# Patient Record
Sex: FEMALE | Race: WHITE
[De-identification: names, ages, dates, MRNs, and addresses within clinical notes are randomized per-mention and may not be internally consistent; named-entity substitution may affect disease eponyms.]

---

## 2020-11-18 ENCOUNTER — HOSPITAL ENCOUNTER (OUTPATIENT)
Dept: HOSPITAL 8 - CFH | Age: 65
Discharge: HOME | End: 2020-11-18
Attending: NURSE PRACTITIONER
Payer: MEDICARE

## 2020-11-18 DIAGNOSIS — Z12.31: Primary | ICD-10-CM

## 2020-11-18 PROCEDURE — 77067 SCR MAMMO BI INCL CAD: CPT

## 2020-11-18 PROCEDURE — 77063 BREAST TOMOSYNTHESIS BI: CPT

## 2021-01-13 ENCOUNTER — HOSPITAL ENCOUNTER (OUTPATIENT)
Dept: HOSPITAL 8 - CFH | Age: 66
Discharge: HOME | End: 2021-01-13
Attending: FAMILY MEDICINE
Payer: MEDICARE

## 2021-01-13 DIAGNOSIS — M85.88: ICD-10-CM

## 2021-01-13 DIAGNOSIS — Z13.820: Primary | ICD-10-CM

## 2021-01-13 PROCEDURE — 77080 DXA BONE DENSITY AXIAL: CPT

## 2021-03-03 DIAGNOSIS — Z23 NEED FOR VACCINATION: ICD-10-CM

## 2023-06-22 ENCOUNTER — HOSPITAL ENCOUNTER (EMERGENCY)
Facility: MEDICAL CENTER | Age: 68
End: 2023-06-22
Attending: EMERGENCY MEDICINE
Payer: MEDICARE

## 2023-06-22 ENCOUNTER — APPOINTMENT (OUTPATIENT)
Dept: RADIOLOGY | Facility: MEDICAL CENTER | Age: 68
End: 2023-06-22
Attending: EMERGENCY MEDICINE
Payer: MEDICARE

## 2023-06-22 VITALS
DIASTOLIC BLOOD PRESSURE: 75 MMHG | SYSTOLIC BLOOD PRESSURE: 140 MMHG | HEIGHT: 65 IN | RESPIRATION RATE: 16 BRPM | OXYGEN SATURATION: 96 % | TEMPERATURE: 98.3 F | BODY MASS INDEX: 23.1 KG/M2 | HEART RATE: 68 BPM | WEIGHT: 138.67 LBS

## 2023-06-22 DIAGNOSIS — N13.2 HYDRONEPHROSIS WITH URINARY OBSTRUCTION DUE TO URETERAL CALCULUS: ICD-10-CM

## 2023-06-22 DIAGNOSIS — R11.2 NAUSEA AND VOMITING, UNSPECIFIED VOMITING TYPE: ICD-10-CM

## 2023-06-22 DIAGNOSIS — I71.21 ANEURYSM OF ASCENDING AORTA WITHOUT RUPTURE (HCC): ICD-10-CM

## 2023-06-22 DIAGNOSIS — N20.1 URETERAL STONE: ICD-10-CM

## 2023-06-22 LAB
ALBUMIN SERPL BCP-MCNC: 4.4 G/DL (ref 3.2–4.9)
ALBUMIN/GLOB SERPL: 1.7 G/DL
ALP SERPL-CCNC: 81 U/L (ref 30–99)
ALT SERPL-CCNC: 27 U/L (ref 2–50)
ANION GAP SERPL CALC-SCNC: 12 MMOL/L (ref 7–16)
APPEARANCE UR: CLEAR
AST SERPL-CCNC: 25 U/L (ref 12–45)
BASOPHILS # BLD AUTO: 0.9 % (ref 0–1.8)
BASOPHILS # BLD: 0.08 K/UL (ref 0–0.12)
BILIRUB SERPL-MCNC: 0.2 MG/DL (ref 0.1–1.5)
BILIRUB UR QL STRIP.AUTO: NEGATIVE
BUN SERPL-MCNC: 18 MG/DL (ref 8–22)
CALCIUM ALBUM COR SERPL-MCNC: 10 MG/DL (ref 8.5–10.5)
CALCIUM SERPL-MCNC: 10.3 MG/DL (ref 8.4–10.2)
CHLORIDE SERPL-SCNC: 101 MMOL/L (ref 96–112)
CO2 SERPL-SCNC: 25 MMOL/L (ref 20–33)
COLOR UR: YELLOW
CREAT SERPL-MCNC: 0.85 MG/DL (ref 0.5–1.4)
EOSINOPHIL # BLD AUTO: 0.23 K/UL (ref 0–0.51)
EOSINOPHIL NFR BLD: 2.6 % (ref 0–6.9)
ERYTHROCYTE [DISTWIDTH] IN BLOOD BY AUTOMATED COUNT: 42.5 FL (ref 35.9–50)
GFR SERPLBLD CREATININE-BSD FMLA CKD-EPI: 74 ML/MIN/1.73 M 2
GLOBULIN SER CALC-MCNC: 2.6 G/DL (ref 1.9–3.5)
GLUCOSE SERPL-MCNC: 70 MG/DL (ref 65–99)
GLUCOSE UR STRIP.AUTO-MCNC: NEGATIVE MG/DL
HCT VFR BLD AUTO: 41 % (ref 37–47)
HGB BLD-MCNC: 13.8 G/DL (ref 12–16)
IMM GRANULOCYTES # BLD AUTO: 0.02 K/UL (ref 0–0.11)
IMM GRANULOCYTES NFR BLD AUTO: 0.2 % (ref 0–0.9)
KETONES UR STRIP.AUTO-MCNC: NEGATIVE MG/DL
LEUKOCYTE ESTERASE UR QL STRIP.AUTO: NEGATIVE
LYMPHOCYTES # BLD AUTO: 3.91 K/UL (ref 1–4.8)
LYMPHOCYTES NFR BLD: 44.1 % (ref 22–41)
MCH RBC QN AUTO: 31.2 PG (ref 27–33)
MCHC RBC AUTO-ENTMCNC: 33.7 G/DL (ref 32.2–35.5)
MCV RBC AUTO: 92.6 FL (ref 81.4–97.8)
MICRO URNS: NORMAL
MONOCYTES # BLD AUTO: 0.86 K/UL (ref 0–0.85)
MONOCYTES NFR BLD AUTO: 9.7 % (ref 0–13.4)
NEUTROPHILS # BLD AUTO: 3.76 K/UL (ref 1.82–7.42)
NEUTROPHILS NFR BLD: 42.5 % (ref 44–72)
NITRITE UR QL STRIP.AUTO: NEGATIVE
NRBC # BLD AUTO: 0 K/UL
NRBC BLD-RTO: 0 /100 WBC (ref 0–0.2)
PH UR STRIP.AUTO: 5.5 [PH] (ref 5–8)
PLATELET # BLD AUTO: 313 K/UL (ref 164–446)
PMV BLD AUTO: 9.3 FL (ref 9–12.9)
POTASSIUM SERPL-SCNC: 3.7 MMOL/L (ref 3.6–5.5)
PROT SERPL-MCNC: 7 G/DL (ref 6–8.2)
PROT UR QL STRIP: NEGATIVE MG/DL
RBC # BLD AUTO: 4.43 M/UL (ref 4.2–5.4)
RBC UR QL AUTO: NEGATIVE
SODIUM SERPL-SCNC: 138 MMOL/L (ref 135–145)
SP GR UR STRIP.AUTO: >=1.03
WBC # BLD AUTO: 8.9 K/UL (ref 4.8–10.8)

## 2023-06-22 PROCEDURE — A9270 NON-COVERED ITEM OR SERVICE: HCPCS | Performed by: STUDENT IN AN ORGANIZED HEALTH CARE EDUCATION/TRAINING PROGRAM

## 2023-06-22 PROCEDURE — 85025 COMPLETE CBC W/AUTO DIFF WBC: CPT

## 2023-06-22 PROCEDURE — 96374 THER/PROPH/DIAG INJ IV PUSH: CPT

## 2023-06-22 PROCEDURE — 96375 TX/PRO/DX INJ NEW DRUG ADDON: CPT

## 2023-06-22 PROCEDURE — 80053 COMPREHEN METABOLIC PANEL: CPT

## 2023-06-22 PROCEDURE — 700102 HCHG RX REV CODE 250 W/ 637 OVERRIDE(OP): Performed by: STUDENT IN AN ORGANIZED HEALTH CARE EDUCATION/TRAINING PROGRAM

## 2023-06-22 PROCEDURE — 96376 TX/PRO/DX INJ SAME DRUG ADON: CPT

## 2023-06-22 PROCEDURE — 81003 URINALYSIS AUTO W/O SCOPE: CPT

## 2023-06-22 PROCEDURE — 99284 EMERGENCY DEPT VISIT MOD MDM: CPT

## 2023-06-22 PROCEDURE — 700105 HCHG RX REV CODE 258: Performed by: EMERGENCY MEDICINE

## 2023-06-22 PROCEDURE — 700111 HCHG RX REV CODE 636 W/ 250 OVERRIDE (IP): Performed by: STUDENT IN AN ORGANIZED HEALTH CARE EDUCATION/TRAINING PROGRAM

## 2023-06-22 PROCEDURE — 36415 COLL VENOUS BLD VENIPUNCTURE: CPT

## 2023-06-22 PROCEDURE — 700111 HCHG RX REV CODE 636 W/ 250 OVERRIDE (IP): Performed by: EMERGENCY MEDICINE

## 2023-06-22 PROCEDURE — 74176 CT ABD & PELVIS W/O CONTRAST: CPT

## 2023-06-22 RX ORDER — ONDANSETRON 2 MG/ML
4 INJECTION INTRAMUSCULAR; INTRAVENOUS ONCE
Status: COMPLETED | OUTPATIENT
Start: 2023-06-22 | End: 2023-06-22

## 2023-06-22 RX ORDER — OXYCODONE HYDROCHLORIDE 5 MG/1
5 TABLET ORAL EVERY 4 HOURS PRN
Qty: 16 TABLET | Refills: 0 | Status: SHIPPED | OUTPATIENT
Start: 2023-06-22 | End: 2023-06-27

## 2023-06-22 RX ORDER — ONDANSETRON 4 MG/1
4 TABLET, ORALLY DISINTEGRATING ORAL EVERY 8 HOURS PRN
Qty: 10 TABLET | Refills: 1 | Status: SHIPPED | OUTPATIENT
Start: 2023-06-22 | End: 2023-08-11

## 2023-06-22 RX ORDER — TAMSULOSIN HYDROCHLORIDE 0.4 MG/1
0.4 CAPSULE ORAL
Qty: 7 CAPSULE | Refills: 0 | Status: SHIPPED | OUTPATIENT
Start: 2023-06-22 | End: 2023-06-22 | Stop reason: SDUPTHER

## 2023-06-22 RX ORDER — TAMSULOSIN HYDROCHLORIDE 0.4 MG/1
0.4 CAPSULE ORAL
Qty: 7 CAPSULE | Refills: 0 | Status: SHIPPED | OUTPATIENT
Start: 2023-06-22 | End: 2023-06-29

## 2023-06-22 RX ORDER — SODIUM CHLORIDE 9 MG/ML
1000 INJECTION, SOLUTION INTRAVENOUS ONCE
Status: COMPLETED | OUTPATIENT
Start: 2023-06-22 | End: 2023-06-22

## 2023-06-22 RX ORDER — ONDANSETRON 4 MG/1
4 TABLET, ORALLY DISINTEGRATING ORAL EVERY 8 HOURS PRN
Qty: 10 TABLET | Refills: 1 | Status: SHIPPED | OUTPATIENT
Start: 2023-06-22 | End: 2023-06-22 | Stop reason: SDUPTHER

## 2023-06-22 RX ORDER — ACETAMINOPHEN 500 MG
1000 TABLET ORAL ONCE
Status: COMPLETED | OUTPATIENT
Start: 2023-06-22 | End: 2023-06-22

## 2023-06-22 RX ORDER — OXYCODONE HYDROCHLORIDE 5 MG/1
5 TABLET ORAL EVERY 4 HOURS PRN
Qty: 16 TABLET | Refills: 0 | Status: SHIPPED | OUTPATIENT
Start: 2023-06-22 | End: 2023-06-22 | Stop reason: SDUPTHER

## 2023-06-22 RX ORDER — KETOROLAC TROMETHAMINE 30 MG/ML
15 INJECTION, SOLUTION INTRAMUSCULAR; INTRAVENOUS ONCE
Status: COMPLETED | OUTPATIENT
Start: 2023-06-22 | End: 2023-06-22

## 2023-06-22 RX ADMIN — FENTANYL CITRATE 50 MCG: 50 INJECTION, SOLUTION INTRAMUSCULAR; INTRAVENOUS at 22:56

## 2023-06-22 RX ADMIN — ACETAMINOPHEN 1000 MG: 500 TABLET ORAL at 22:56

## 2023-06-22 RX ADMIN — KETOROLAC TROMETHAMINE 15 MG: 30 INJECTION, SOLUTION INTRAMUSCULAR; INTRAVENOUS at 22:15

## 2023-06-22 RX ADMIN — ONDANSETRON 4 MG: 2 INJECTION INTRAMUSCULAR; INTRAVENOUS at 22:15

## 2023-06-22 RX ADMIN — SODIUM CHLORIDE 1000 ML: 9 INJECTION, SOLUTION INTRAVENOUS at 20:48

## 2023-06-22 RX ADMIN — ONDANSETRON 4 MG: 2 INJECTION INTRAMUSCULAR; INTRAVENOUS at 20:49

## 2023-06-22 ASSESSMENT — LIFESTYLE VARIABLES
AVERAGE NUMBER OF DAYS PER WEEK YOU HAVE A DRINK CONTAINING ALCOHOL: 0
DO YOU DRINK ALCOHOL: NO
ON A TYPICAL DAY WHEN YOU DRINK ALCOHOL HOW MANY DRINKS DO YOU HAVE: 0
EVER HAD A DRINK FIRST THING IN THE MORNING TO STEADY YOUR NERVES TO GET RID OF A HANGOVER: NO
HAVE PEOPLE ANNOYED YOU BY CRITICIZING YOUR DRINKING: NO
TOTAL SCORE: 0
TOTAL SCORE: 0
HOW MANY TIMES IN THE PAST YEAR HAVE YOU HAD 5 OR MORE DRINKS IN A DAY: 0
EVER FELT BAD OR GUILTY ABOUT YOUR DRINKING: NO
TOTAL SCORE: 0
CONSUMPTION TOTAL: NEGATIVE
HAVE YOU EVER FELT YOU SHOULD CUT DOWN ON YOUR DRINKING: NO

## 2023-06-22 ASSESSMENT — PAIN DESCRIPTION - DESCRIPTORS: DESCRIPTORS: SHARP

## 2023-06-22 ASSESSMENT — FIBROSIS 4 INDEX: FIB4 SCORE: 1.33

## 2023-06-23 NOTE — ED TRIAGE NOTES
"Chief Complaint   Patient presents with    Flank Pain     Flank pain x 2 hours, nausea, and vomiting. Pt states she feels like she may have a UTI.     Urinary Frequency     Pt notes urinary frequency and discomfort x 2 days      BP (!) 143/86   Pulse 61   Temp 36.9 °C (98.4 °F) (Temporal)   Resp 18   Ht 1.651 m (5' 5\")   Wt 62.9 kg (138 lb 10.7 oz)   SpO2 98%   BMI 23.08 kg/m²     "

## 2023-06-23 NOTE — ED NOTES
Went to discharge pt, pt requesting more pain meds before she leaves. ERP aware and went to talk to pt

## 2023-06-23 NOTE — ED PROVIDER NOTES
Patient was seen earlier diagnosed with ureteral colic prior to discharge she requested a single dose of narcotic pain medications, as well as changing her narcotic prescription to the 24-hour pharmacy.  She was given a dose of fentanyl observed in the emergency department with good pain control no further episodes of vomiting her up oxycodone was sent to the 24-hour [facility] and she was discharged in a stable condition

## 2023-06-23 NOTE — DISCHARGE INSTRUCTIONS
Follow-up with urology.  Strain your urine to catch the stone.  Return for uncontrollable pain or nausea and vomiting.    Follow-up with cardiology, your CAT scan today revealed 4.2 cm a sending aortic aneurysm which needs follow-up and monitoring.

## 2023-06-23 NOTE — ED PROVIDER NOTES
"ED Provider Note    CHIEF COMPLAINT  Chief Complaint   Patient presents with    Flank Pain     Flank pain x 2 hours, nausea, and vomiting. Pt states she feels like she may have a UTI.     Urinary Frequency     Pt notes urinary frequency and discomfort x 2 days          HPI/ROS  LIMITATION TO HISTORY   Select: : None  OUTSIDE HISTORIAN(S):  Significant other patient's  at bedside for discussion of symptoms    Jerad Rodriguez is a 67 y.o. female who presents with left flank pain, nausea and vomiting onset during workout.  No associated trauma.  No history of kidney stones.  She has had urinary urgency in the last 3 days.  No fever or chills.  No vaginal discharge, no hematuria.  She denies anterior abdominal pain.  No chest pain or difficulty breathing.  No chest pain.    PAST MEDICAL HISTORY       SURGICAL HISTORY  patient denies any surgical history    FAMILY HISTORY  No family history on file.    SOCIAL HISTORY  Social History     Tobacco Use    Smoking status: Not on file    Smokeless tobacco: Not on file   Substance and Sexual Activity    Alcohol use: Not on file    Drug use: Not on file    Sexual activity: Not on file       CURRENT MEDICATIONS  Home Medications       Reviewed by Jimmie Rome R.N. (Registered Nurse) on 06/22/23 at 2037  Med List Status: Not Addressed     Medication Last Dose Status        Patient Carl Taking any Medications                           ALLERGIES  Allergies   Allergen Reactions    Ampicillin Rash     Itching, hives    Sulfa Drugs Hives       PHYSICAL EXAM  VITAL SIGNS: BP (!) 142/78   Pulse 67   Temp 36.9 °C (98.4 °F) (Temporal)   Resp 16   Ht 1.651 m (5' 5\")   Wt 62.9 kg (138 lb 10.7 oz)   SpO2 98%   BMI 23.08 kg/m²    Constitutional: Well-nourished, mild distress  Musculoskeletal: Left flank tenderness  GI: Abdomen is soft and nontender, no guarding, no distention  Neurologic: Speech clear, strength intact  Respiratory: Clear lung sounds  Cardiac: Regular rate and " rhythm    DIAGNOSTIC STUDIES / PROCEDURES      LABS  Results for orders placed or performed during the hospital encounter of 06/22/23   URINALYSIS (UA)    Specimen: Urine   Result Value Ref Range    Color Yellow     Character Clear     Specific Gravity >=1.030 <1.035    Ph 5.5 5.0 - 8.0    Glucose Negative Negative mg/dL    Ketones Negative Negative mg/dL    Protein Negative Negative mg/dL    Bilirubin Negative Negative    Nitrite Negative Negative    Leukocyte Esterase Negative Negative    Occult Blood Negative Negative    Micro Urine Req see below    CBC WITH DIFFERENTIAL   Result Value Ref Range    WBC 8.9 4.8 - 10.8 K/uL    RBC 4.43 4.20 - 5.40 M/uL    Hemoglobin 13.8 12.0 - 16.0 g/dL    Hematocrit 41.0 37.0 - 47.0 %    MCV 92.6 81.4 - 97.8 fL    MCH 31.2 27.0 - 33.0 pg    MCHC 33.7 32.2 - 35.5 g/dL    RDW 42.5 35.9 - 50.0 fL    Platelet Count 313 164 - 446 K/uL    MPV 9.3 9.0 - 12.9 fL    Neutrophils-Polys 42.50 (L) 44.00 - 72.00 %    Lymphocytes 44.10 (H) 22.00 - 41.00 %    Monocytes 9.70 0.00 - 13.40 %    Eosinophils 2.60 0.00 - 6.90 %    Basophils 0.90 0.00 - 1.80 %    Immature Granulocytes 0.20 0.00 - 0.90 %    Nucleated RBC 0.00 0.00 - 0.20 /100 WBC    Neutrophils (Absolute) 3.76 1.82 - 7.42 K/uL    Lymphs (Absolute) 3.91 1.00 - 4.80 K/uL    Monos (Absolute) 0.86 (H) 0.00 - 0.85 K/uL    Eos (Absolute) 0.23 0.00 - 0.51 K/uL    Baso (Absolute) 0.08 0.00 - 0.12 K/uL    Immature Granulocytes (abs) 0.02 0.00 - 0.11 K/uL    NRBC (Absolute) 0.00 K/uL   COMP METABOLIC PANEL   Result Value Ref Range    Sodium 138 135 - 145 mmol/L    Potassium 3.7 3.6 - 5.5 mmol/L    Chloride 101 96 - 112 mmol/L    Co2 25 20 - 33 mmol/L    Anion Gap 12.0 7.0 - 16.0    Glucose 70 65 - 99 mg/dL    Bun 18 8 - 22 mg/dL    Creatinine 0.85 0.50 - 1.40 mg/dL    Calcium 10.3 (H) 8.4 - 10.2 mg/dL    AST(SGOT) 25 12 - 45 U/L    ALT(SGPT) 27 2 - 50 U/L    Alkaline Phosphatase 81 30 - 99 U/L    Total Bilirubin 0.2 0.1 - 1.5 mg/dL    Albumin 4.4  3.2 - 4.9 g/dL    Total Protein 7.0 6.0 - 8.2 g/dL    Globulin 2.6 1.9 - 3.5 g/dL    A-G Ratio 1.7 g/dL   CORRECTED CALCIUM   Result Value Ref Range    Correct Calcium 10.0 8.5 - 10.5 mg/dL   ESTIMATED GFR   Result Value Ref Range    GFR (CKD-EPI) 74 >60 mL/min/1.73 m 2         RADIOLOGY  I have independently interpreted the diagnostic imaging associated with this visit and am waiting the final reading from the radiologist.   My preliminary interpretation is as follows: CT scan abdomen pelvis shows left ureteral stone  Radiologist interpretation:   CT-RENAL COLIC EVALUATION(A/P W/O)   Final Result         1.  Left hydronephrosis and hydroureter with 4.0 mm calcification in the expected location of the left ureterovesicular junction suggesting obstructing ureterovesicular junction stone.   2.  4.2 cm proximal ascending thoracic aortic aneurysm, radiographic follow-up and surveillance recommended as clinically appropriate   3.  Atherosclerosis            COURSE & MEDICAL DECISION MAKING    ED Observation Status? No; Patient does not meet criteria for ED Observation.     INITIAL ASSESSMENT, COURSE AND PLAN  Care Narrative: Patient presents with left flank pain, nausea and vomiting concerning for kidney stone.  Urinalysis obtained to rule out pyelonephritis or UTI which was of concern given the evidence of obstructing left ureteral stone.  Urinalysis is negative for both blood and infection.  Patient provided urine strainer.  Differential diagnosis of the flank pain also included injury however no evidence of trauma on CT scan.  Nausea and vomiting is likely secondary to the obstructing stone, with hydronephrosis.  This was treated with Zofran twice in the ER, currently no active vomiting.  Patient received IV fluids for vomiting, tolerated this well with improvement.  Patient received 15 mg of IV Toradol with moderate relief of discomfort.  Patient states she feels well enough to go home, I have prescribed oxycodone  for pain, Zofran for nausea, Flomax to help pass the stone.  They are provided urine strainer, referred to urology.  HYDRATION: Based on the patient's presentation of Acute Vomiting the patient was given IV fluids. IV Hydration was used because oral hydration was not adequate alone. Upon recheck following hydration, the patient was improving.      ADDITIONAL PROBLEM LIST  Ascending aortic aneurysm: Incidental finding on her renal colic CT study.  Measured at 4.2 cm, this was discussed with the patient and her  and the need for follow-up, they have been referred to cardiology to establish care and for follow-up testing.    DISPOSITION AND DISCUSSIONS    Escalation of care considered, and ultimately not performed:acute inpatient care management, however at this time, the patient is most appropriate for outpatient management    Decision tools and prescription drugs considered including, but not limited to: Antibiotics were not required, no evidence of pyelonephritis .    FINAL DIAGNOSIS  1. Ureteral stone    2. Hydronephrosis with urinary obstruction due to ureteral calculus    3. Nausea and vomiting, unspecified vomiting type    4. Aneurysm of ascending aorta without rupture (HCC)           Electronically signed by: Mick Matias M.D., 6/22/2023 10:21 PM

## 2023-08-03 ENCOUNTER — TELEPHONE (OUTPATIENT)
Dept: CARDIOLOGY | Facility: MEDICAL CENTER | Age: 68
End: 2023-08-03
Payer: MEDICARE

## 2023-08-03 NOTE — TELEPHONE ENCOUNTER
Called patient to request records for NP appointment with   . Called to confirm if this will be first time patient is seeing a cardiologist. No answer, left voicemail to call back.

## 2023-08-11 ENCOUNTER — OFFICE VISIT (OUTPATIENT)
Dept: CARDIOLOGY | Facility: MEDICAL CENTER | Age: 68
End: 2023-08-11
Attending: EMERGENCY MEDICINE
Payer: MEDICARE

## 2023-08-11 VITALS
OXYGEN SATURATION: 100 % | RESPIRATION RATE: 16 BRPM | BODY MASS INDEX: 22.59 KG/M2 | DIASTOLIC BLOOD PRESSURE: 68 MMHG | HEIGHT: 65 IN | SYSTOLIC BLOOD PRESSURE: 106 MMHG | WEIGHT: 135.6 LBS | HEART RATE: 63 BPM

## 2023-08-11 DIAGNOSIS — E78.00 ELEVATED SERUM CHOLESTEROL: ICD-10-CM

## 2023-08-11 DIAGNOSIS — I71.21 ANEURYSM OF ASCENDING AORTA WITHOUT RUPTURE (HCC): ICD-10-CM

## 2023-08-11 PROCEDURE — 3074F SYST BP LT 130 MM HG: CPT | Performed by: INTERNAL MEDICINE

## 2023-08-11 PROCEDURE — 3078F DIAST BP <80 MM HG: CPT | Performed by: INTERNAL MEDICINE

## 2023-08-11 PROCEDURE — 99203 OFFICE O/P NEW LOW 30 MIN: CPT | Performed by: INTERNAL MEDICINE

## 2023-08-11 PROCEDURE — 99211 OFF/OP EST MAY X REQ PHY/QHP: CPT | Performed by: INTERNAL MEDICINE

## 2023-08-11 PROCEDURE — 93005 ELECTROCARDIOGRAM TRACING: CPT | Performed by: INTERNAL MEDICINE

## 2023-08-11 RX ORDER — TIMOLOL MALEATE 2.5 MG/ML
1 SOLUTION OPHTHALMIC 2 TIMES DAILY
COMMUNITY

## 2023-08-11 RX ORDER — ESTRADIOL 0.1 MG/G
CREAM VAGINAL
COMMUNITY
Start: 2023-04-28

## 2023-08-11 ASSESSMENT — FIBROSIS 4 INDEX: FIB4 SCORE: 1.05

## 2023-08-11 NOTE — ASSESSMENT & PLAN NOTE
Clinically, she is doing excellent and has well-controlled blood pressure.  At this time her incidental aortic aneurysm was noted to be proximal in nature.  It was also measured to be 4.2 cm.  I recommended that she have a repeat CT scan with IV contrast in 1 year to assess for growth.  She will continue to focusing on healthy lifestyle and integration of ongoing exercise as well as a low-fat diet.

## 2023-08-11 NOTE — PROGRESS NOTES
[facility]    PatientName:Jerad MechamDate: 2023  :1955    67 y.o.PCP:Gretel Kumar P.A.-C.  MRN:[id number]          Problems and Plans    No problem-specific Assessment & Plan notes found for this encounter.    Return in about 1 year (around 2024).      Encounter    Reason for Visit / Chief Complaint: Abdominal aortic aneurysm    HPI    67-year-old female without significant cardiovascular history presents in consultation for abdominal aortic aneurysm.    She been her usual state of health until late 2023 when she ultimately suffered from acute nephrolithiasis.  She presented to the emergency department for further evaluations underwent CT scan which demonstrated incidental finding of an abdominal aortic aneurysm noted to be 4.2 cm in the proximal portion.  She has not had any adverse complaints such as a ripping abdominal pain or a pulsation.  She is able to do her desired activities and enjoys exercising 3 times per week at the gym doing combination of treadmill and mat work.  She also enjoys playing pickle ball multiple times per week    No prior cardiovascular workup  Past Medical History  History reviewed. No pertinent past medical history.  Past Surgical History  History reviewed. No pertinent surgical history.  Social History  Social History     Socioeconomic History    Marital status:      Spouse name: Not on file    Number of children: Not on file    Years of education: Not on file    Highest education level: Not on file   Occupational History    Not on file   Tobacco Use    Smoking status: Never    Smokeless tobacco: Never   Substance and Sexual Activity    Alcohol use: Yes     Comment: Occ    Drug use: Never    Sexual activity: Not on file   Other Topics Concern    Not on file   Social History Narrative    Not on file     Social Determinants of Health     Financial Resource Strain: Not on file   Food Insecurity: Not on file  "  Transportation Needs: Not on file   Physical Activity: Not on file   Stress: Not on file   Social Connections: Not on file   Intimate Partner Violence: Not on file   Housing Stability: Not on file     Past Family History  History reviewed. No pertinent family history.  Medication(s)  [unfilled]  Allergies  Ampicillin and Sulfa drugs    Review of Systems    A comprehensive 10 system review was conducted and is negative except as noted above in the HPI or here.      Vital Signs  /68 (BP Location: Left arm, Patient Position: Sitting, BP Cuff Size: Adult)   Pulse 63   Resp 16   Ht 1.651 m (5' 5\")   Wt 61.5 kg (135 lb 9.6 oz)   SpO2 100%   BMI 22.57 kg/m²     Physical Exam  Vitals and nursing note reviewed.   Constitutional:       Appearance: Normal appearance.   HENT:      Head: Normocephalic and atraumatic.      Nose: Nose normal.      Mouth/Throat:      Mouth: Mucous membranes are moist.      Pharynx: Oropharynx is clear.   Eyes:      Pupils: Pupils are equal, round, and reactive to light.   Cardiovascular:      Rate and Rhythm: Normal rate and regular rhythm.      Heart sounds: No murmur heard.     No friction rub. No gallop.   Pulmonary:      Effort: Pulmonary effort is normal.      Breath sounds: Normal breath sounds.   Abdominal:      General: Bowel sounds are normal.      Palpations: Abdomen is soft.   Musculoskeletal:         General: Normal range of motion.      Cervical back: Normal range of motion and neck supple.   Skin:     General: Skin is warm and dry.   Neurological:      General: No focal deficit present.      Mental Status: She is alert and oriented to person, place, and time. Mental status is at baseline.   Psychiatric:         Mood and Affect: Mood normal.         Behavior: Behavior normal.         Thought Content: Thought content normal.         Judgment: Judgment normal.         Lab Results   Component Value Date/Time    TSHULTRASEN 1.390 10/06/2011 1327      No results found " for: FREET4   No results found for: HBA1C  No results found for: CHOLSTRLTOT, LDL, HDL, TRIGLYCERIDE      Lab Results   Component Value Date/Time    SODIUM 138 06/22/2023 08:50 PM    POTASSIUM 3.7 06/22/2023 08:50 PM    CHLORIDE 101 06/22/2023 08:50 PM    CO2 25 06/22/2023 08:50 PM    GLUCOSE 70 06/22/2023 08:50 PM    BUN 18 06/22/2023 08:50 PM    CREATININE 0.85 06/22/2023 08:50 PM    GLOMRATE 79 05/17/2023 10:49 AM       Lab Results   Component Value Date/Time    ALKPHOSPHAT 81 06/22/2023 08:50 PM    ASTSGOT 25 06/22/2023 08:50 PM    ALTSGPT 27 06/22/2023 08:50 PM    TBILIRUBIN 0.2 06/22/2023 08:50 PM         Imaging  EKG demonstrates sinus bradycardia with first-degree AV block and nonspecific ST-T wave abnormalities.  I reviewed interpreted EKG.  Plans are discussed with the patient          Total patient time was estimated to be 25 minutes consisting of chart review, direct patient interaction, medication renewal, plan development and overall communication with the cardiovascular team.        Electronically signed by:   Benjamin Roberto DO, MPH  [facility]    Portions of this note were completed using voice recognition software (Dragon Naturally speaking software) . Occasional transcription errors may have escaped proof reading. I have made every reasonable attempt to correct obvious errors, but I expect that there are errors of grammar and possibly content that I did not discover before finalizing the note.

## 2023-08-11 NOTE — ASSESSMENT & PLAN NOTE
Regarding her elevated cholesterol her most recent lipid panel was performed back on 7/29/2023 which demonstrated a total cholesterol of 253 mg/dL, triglycerides of 106 mg/dL, HDL of 75 mg/dL and an LDL of 160 mg/dL.  Her atherosclerotic cardiovascular risk calculator demonstrated 5.4% making ongoing lifestyle modification appropriate and no recommendations for statin therapy at this time

## 2023-08-11 NOTE — PATIENT INSTRUCTIONS
Follow up in 12 months  Check repeat CT abdomen with IV contrast  will need to have BMP prior (blood work)  Continue current medications  Call with questions.

## 2023-08-14 LAB — EKG IMPRESSION: NORMAL

## 2023-08-14 PROCEDURE — 93010 ELECTROCARDIOGRAM REPORT: CPT | Performed by: INTERNAL MEDICINE

## 2023-10-03 ENCOUNTER — TELEPHONE (OUTPATIENT)
Dept: CARDIOLOGY | Facility: MEDICAL CENTER | Age: 68
End: 2023-10-03
Payer: MEDICARE

## 2023-10-03 NOTE — TELEPHONE ENCOUNTER
Last OV: 08/11/2023  Proposed Surgery: Cystoscopy, with Left Ureteroscopy, with Lithotripsy, with insertion of Left Ureteral Stent   Surgery Date: 11/7/2023  Requesting Office Name: [facility]  Fax Number: [phone]  Preference of Location (default is surgery center unless specified by Cardiologist or [place])  Prior Clearance Addressed: No      Anticoags/Antiplatelets: Other none  Outstanding Cardiac Imaging : No  Stent, Cardiac Devices, or Catheterization: No  Ablation, TAVR/Valve (including open heart), Cardioversion: No  Recent Cardiac Hospitalization: No            When: N/A  History (cardiac history): No past medical history on file.          Surgical Clearance Letter Sent: YES   **Scan clearance request letter into [facility].**  '

## 2023-10-03 NOTE — LETTER
PROCEDURE/SURGERY CLEARANCE FORM      Encounter Date: 10/3/2023    Patient: Jerad Rodriguez  YOB: 1955    CARDIOLOGIST:  Benjamin Roberto D.O.    REFERRING DOCTOR:  No ref. provider found    The above patient is cleared to have the following procedure/surgery: Cystoscopy, with Left Ureteroscopy, with Lithotripsy, with insertion of Left Ureteral Stent                                                        Electronically signed       MD Cece Roberto D.O.    PROCEDURE/SURGERY CLEARANCE FORM    Date: 10/3/2023   Patient Name: Jerad Rodriguez    Dear Surgeon or Proceduralist,      Thank you for your request for cardiac stratification of our mutual patient Jerad Rodriguez 1955. We have reviewed their [facility] records; and to the best of our understanding this patient has not had stenting, ablation, cardiothoracic surgery or hospitalization for cardiovascular reasons in the past 6 months.  Jerad Rodriguez has been seen within the past 18 months and is considered to have non-modifiable cardiac risk for this low-risk procedure/surgery. They may proceed from a cardiovascular standpoint and may hold their antiplatelet/anticoagulation as briefly as possible. Please have patient resume this medication when hemodynamically stable to do so.     Aspirin or Prasugrel   - hold 7 days prior to procedure/surgery, resume when hemodynamically stable      Clopidrogrel or Ticagrelor  - hold 7 days for all neurological procedures, hold 5 days prior to all other procedure/surgery,  resume when hemodynamically stable     Warfarin - hold 7 days for all neurological procedures, hold 5 days prior to all other procedure/surgery and coordinate with [facility] ([phone]) INR testing and dose management.      Pradaxa/Xarelto/Eliquis/Savesya - hold 1 day prior to procedure for low bleeding risk procedure, 2 days for high bleeding risk procedure, or consider holding 3 days or longer for patients with reduced kidney  function (CrCl <30mL/min) or spinal/cranial surgeries/procedures.      If they have a mechanical heart valve, please coordinate with [facility] ([phone]) the proper management of their anticoagulant in the periprocedural or perioperative period.      Some patients have higher risk for cardiovascular complications or holding medication. If our patient has had prior complications of holding antiplatelet or anticoagulants in the past and we have seen them after these events, we have addressed these concerns with the patient. They are at an unknown degree of increased risk for recurrent complication.  You may hold anticoagulation/antiplatelets for the procedure or surgery if the benefits of the procedure or surgery outweigh this nonmodifiable risk.      If Jerad Rodriguez 1955 has new symptoms of heart failure decompensation, unstable arrythmia, or angina please reach out and we will assess the patient.      If you have other patient-specific concerns, please feel free to reach out to the patient's cardiologist directly at [phone].     Thank you,       [facility]'

## 2023-10-11 ENCOUNTER — TELEPHONE (OUTPATIENT)
Dept: CARDIOLOGY | Facility: MEDICAL CENTER | Age: 68
End: 2023-10-11
Payer: MEDICARE

## 2023-10-11 NOTE — TELEPHONE ENCOUNTER
HARI    Caller: Tr Coon    Topic/issue: Patient is wondering if BD wanted patient to do the CT scan he ordered as soon as possible or more closer to the year follow up.    Callback Number: [phone]    Thank you,  -Tiff MARIN

## 2023-10-12 NOTE — TELEPHONE ENCOUNTER
Phone Number Called: [phone]    Call outcome: Did not leave a detailed message. Requested patient to call back.    Message: Called to inform patient of BD recommendations. Unable to reach. Left VM for patient to call back when able.     -----------------------     Benjamin Roberto D.O.  You 18 hours ago (6:15 PM)    BD  Closer to f/u appt     You  Benjamin Roberto D.O. 19 hours ago (4:54 PM)    SD  To: BD     Would you like patient's CT scan to be completed asap or closer to next follow up appointment in 1 year? Please advise. Thank you

## 2023-12-08 ENCOUNTER — HOSPITAL ENCOUNTER (OUTPATIENT)
Dept: RADIOLOGY | Facility: MEDICAL CENTER | Age: 68
End: 2023-12-08
Attending: UROLOGY
Payer: MEDICARE

## 2023-12-08 DIAGNOSIS — N20.1 CALCULUS OF URETER: ICD-10-CM

## 2023-12-08 PROCEDURE — 76775 US EXAM ABDO BACK WALL LIM: CPT

## 2024-01-15 ENCOUNTER — HOSPITAL ENCOUNTER (OUTPATIENT)
Dept: RADIOLOGY | Facility: MEDICAL CENTER | Age: 69
End: 2024-01-15
Attending: UROLOGY
Payer: MEDICARE

## 2024-01-15 DIAGNOSIS — N20.1 CALCULUS OF URETER: ICD-10-CM

## 2024-01-15 PROCEDURE — 700117 HCHG RX CONTRAST REV CODE 255: Performed by: UROLOGY

## 2024-01-15 PROCEDURE — 74178 CT ABD&PLV WO CNTR FLWD CNTR: CPT

## 2024-01-15 RX ADMIN — IOHEXOL 100 ML: 350 INJECTION, SOLUTION INTRAVENOUS at 12:00

## 2024-07-02 ENCOUNTER — TELEPHONE (OUTPATIENT)
Dept: CARDIOLOGY | Facility: MEDICAL CENTER | Age: 69
End: 2024-07-02
Payer: MEDICARE

## 2024-07-07 DIAGNOSIS — I71.21 ANEURYSM OF ASCENDING AORTA WITHOUT RUPTURE (HCC): ICD-10-CM

## 2024-07-12 ENCOUNTER — HOSPITAL ENCOUNTER (OUTPATIENT)
Dept: LAB | Facility: MEDICAL CENTER | Age: 69
End: 2024-07-12
Attending: INTERNAL MEDICINE
Payer: MEDICARE

## 2024-07-12 DIAGNOSIS — I71.21 ANEURYSM OF ASCENDING AORTA WITHOUT RUPTURE (HCC): ICD-10-CM

## 2024-07-12 LAB
ANION GAP SERPL CALC-SCNC: 11 MMOL/L (ref 7–16)
BUN SERPL-MCNC: 12 MG/DL (ref 8–22)
CALCIUM SERPL-MCNC: 9.7 MG/DL (ref 8.4–10.2)
CHLORIDE SERPL-SCNC: 104 MMOL/L (ref 96–112)
CO2 SERPL-SCNC: 26 MMOL/L (ref 20–33)
CREAT SERPL-MCNC: 0.75 MG/DL (ref 0.5–1.4)
FASTING STATUS PATIENT QL REPORTED: NORMAL
GFR SERPLBLD CREATININE-BSD FMLA CKD-EPI: 86 ML/MIN/1.73 M 2
GLUCOSE SERPL-MCNC: 104 MG/DL (ref 65–99)
POTASSIUM SERPL-SCNC: 3.9 MMOL/L (ref 3.6–5.5)
SODIUM SERPL-SCNC: 141 MMOL/L (ref 135–145)

## 2024-07-12 PROCEDURE — 80048 BASIC METABOLIC PNL TOTAL CA: CPT

## 2024-07-12 PROCEDURE — 36415 COLL VENOUS BLD VENIPUNCTURE: CPT

## 2024-07-17 ENCOUNTER — APPOINTMENT (OUTPATIENT)
Dept: RADIOLOGY | Facility: MEDICAL CENTER | Age: 69
End: 2024-07-17
Attending: INTERNAL MEDICINE
Payer: MEDICARE

## 2024-07-22 ENCOUNTER — TELEPHONE (OUTPATIENT)
Dept: CARDIOLOGY | Facility: MEDICAL CENTER | Age: 69
End: 2024-07-22
Payer: MEDICARE

## 2024-07-24 ENCOUNTER — TELEMEDICINE (OUTPATIENT)
Dept: CARDIOLOGY | Facility: MEDICAL CENTER | Age: 69
End: 2024-07-24
Attending: INTERNAL MEDICINE
Payer: MEDICARE

## 2024-07-24 VITALS — HEART RATE: 76 BPM

## 2024-07-24 DIAGNOSIS — I71.21 ANEURYSM OF ASCENDING AORTA WITHOUT RUPTURE (HCC): ICD-10-CM

## 2024-07-24 PROCEDURE — 99214 OFFICE O/P EST MOD 30 MIN: CPT | Mod: 95 | Performed by: INTERNAL MEDICINE

## 2024-11-01 ENCOUNTER — PATIENT MESSAGE (OUTPATIENT)
Dept: CARDIOLOGY | Facility: MEDICAL CENTER | Age: 69
End: 2024-11-01
Payer: MEDICARE

## 2024-11-01 NOTE — PATIENT COMMUNICATION
To BE: Please advise, see [employer] message. Pt wants to know if it is safe for her to use Xdemvy eye drops. Thank you.

## 2024-11-06 ENCOUNTER — TELEPHONE (OUTPATIENT)
Dept: CARDIOLOGY | Facility: MEDICAL CENTER | Age: 69
End: 2024-11-06
Payer: MEDICARE

## 2024-11-06 DIAGNOSIS — Z01.812 PRE-PROCEDURE LAB EXAM: ICD-10-CM

## 2024-11-06 NOTE — TELEPHONE ENCOUNTER
BD    Caller: Jerad Rodriguez    Topic/issue: Patient is scheduled for CT on 11/14/24, Dr. Roberto ordered. Imaging told patient that she will need lab order placed so that she can complete by Tuesday 11/12/24. Please advise.    Callback Number: [phone]    Thank you,  Jennifer MCKEON

## 2024-11-08 ENCOUNTER — HOSPITAL ENCOUNTER (OUTPATIENT)
Dept: LAB | Facility: MEDICAL CENTER | Age: 69
End: 2024-11-08
Attending: INTERNAL MEDICINE
Payer: MEDICARE

## 2024-11-08 DIAGNOSIS — Z01.812 PRE-PROCEDURE LAB EXAM: ICD-10-CM

## 2024-11-08 LAB
ANION GAP SERPL CALC-SCNC: 10 MMOL/L (ref 7–16)
BUN SERPL-MCNC: 20 MG/DL (ref 8–22)
CALCIUM SERPL-MCNC: 9.5 MG/DL (ref 8.4–10.2)
CHLORIDE SERPL-SCNC: 103 MMOL/L (ref 96–112)
CO2 SERPL-SCNC: 25 MMOL/L (ref 20–33)
CREAT SERPL-MCNC: 0.77 MG/DL (ref 0.5–1.4)
FASTING STATUS PATIENT QL REPORTED: NORMAL
GFR SERPLBLD CREATININE-BSD FMLA CKD-EPI: 83 ML/MIN/1.73 M 2
GLUCOSE SERPL-MCNC: 107 MG/DL (ref 65–99)
POTASSIUM SERPL-SCNC: 4 MMOL/L (ref 3.6–5.5)
SODIUM SERPL-SCNC: 138 MMOL/L (ref 135–145)

## 2024-11-08 PROCEDURE — 36415 COLL VENOUS BLD VENIPUNCTURE: CPT

## 2024-11-08 PROCEDURE — 80048 BASIC METABOLIC PNL TOTAL CA: CPT

## 2024-11-08 NOTE — TELEPHONE ENCOUNTER
Nonfastnig Lab ordered. Upon chart review, pt is active on [employer].  Last login 11/6/2024.  Masher message sent regarding findings    ======================    Other (Lab Order)  Benjamin Roberto D.O.  Ethyl Angelica Anton R.N.23 hours ago (6:37 PM)     yes

## 2024-11-13 ENCOUNTER — HOSPITAL ENCOUNTER (OUTPATIENT)
Dept: RADIOLOGY | Facility: MEDICAL CENTER | Age: 69
End: 2024-11-13
Attending: INTERNAL MEDICINE
Payer: MEDICARE

## 2024-11-13 DIAGNOSIS — I71.21 ANEURYSM OF ASCENDING AORTA WITHOUT RUPTURE (HCC): ICD-10-CM

## 2024-11-13 PROCEDURE — 71275 CT ANGIOGRAPHY CHEST: CPT

## 2024-11-13 PROCEDURE — 700117 HCHG RX CONTRAST REV CODE 255: Performed by: INTERNAL MEDICINE

## 2024-11-13 RX ADMIN — IOHEXOL 80 ML: 350 INJECTION, SOLUTION INTRAVENOUS at 12:24

## 2024-11-15 ENCOUNTER — TELEPHONE (OUTPATIENT)
Dept: CARDIOLOGY | Facility: MEDICAL CENTER | Age: 69
End: 2024-11-15
Payer: MEDICARE

## 2024-11-15 NOTE — TELEPHONE ENCOUNTER
----- Message from Physician Benjamin Roberto D.O. sent at 11/15/2024  6:26 AM PST -----  Regarding: RE: CT order  Everardo copeland  ----- Message -----  From: Jim Kulkarni  Sent: 11/6/2024   2:43 PM PST  To: Benjamin Roberto D.O.  Subject: CT order                                         This patient called to schedule their CT but it has been over 60 days from the labs you'd had ordered that she'd done can you re-order those labs    lab Orders done within 60 days of the CT when ordered with IV contrast  Acceptable labs are CMP, BMP, CREATNINE(BUN)

## 2024-11-20 ENCOUNTER — OFFICE VISIT (OUTPATIENT)
Dept: CARDIOLOGY | Facility: MEDICAL CENTER | Age: 69
End: 2024-11-20
Attending: INTERNAL MEDICINE
Payer: MEDICARE

## 2024-11-20 VITALS
RESPIRATION RATE: 16 BRPM | BODY MASS INDEX: 22.66 KG/M2 | SYSTOLIC BLOOD PRESSURE: 110 MMHG | WEIGHT: 136 LBS | HEART RATE: 98 BPM | OXYGEN SATURATION: 53 % | HEIGHT: 65 IN | DIASTOLIC BLOOD PRESSURE: 70 MMHG

## 2024-11-20 DIAGNOSIS — I71.21 ANEURYSM OF ASCENDING AORTA WITHOUT RUPTURE (HCC): ICD-10-CM

## 2024-11-20 PROCEDURE — 99213 OFFICE O/P EST LOW 20 MIN: CPT | Performed by: INTERNAL MEDICINE

## 2024-11-20 PROCEDURE — 99211 OFF/OP EST MAY X REQ PHY/QHP: CPT | Performed by: INTERNAL MEDICINE

## 2024-11-20 RX ORDER — LOTILANER OPHTHALMIC SOLUTION 2.5 MG/ML
25 SOLUTION/ DROPS OPHTHALMIC 2 TIMES DAILY
COMMUNITY

## 2024-11-20 ASSESSMENT — FIBROSIS 4 INDEX: FIB4 SCORE: 1.08

## 2024-11-20 NOTE — ASSESSMENT & PLAN NOTE
Clinically, she is doing excellent at this time based on her CTA has seen resolution of her ascending aortic aneurysm.  I would not recommend any further cardiovascular testing she will continue to focusing on primary prevention and ongoing health maintenance with her primary care provider.  No further cardiovascular workup is needed at this time

## 2024-11-20 NOTE — PROGRESS NOTES
[facility]    PatientName:Jerad MechamDate: 2024  :1955    69 y.o.PCP:Gretel Kumar P.A.-C.  MRN:[id number]        Problems and Plans    Aneurysm of ascending aorta without rupture (HCC)  Clinically, she is doing excellent at this time based on her CTA has seen resolution of her ascending aortic aneurysm.  I would not recommend any further cardiovascular testing she will continue to focusing on primary prevention and ongoing health maintenance with her primary care provider.  No further cardiovascular workup is needed at this time    Return if symptoms worsen or fail to improve.      Encounter    Reason for Visit / Chief Complaint: Send aortic aneurysm    HPI    69-year-old female without significant cardiovascular history presents in virtual visit for ongoing management of her ascending aortic aneurysm     Currently, she notes she is feeling well and not having adverse cardiovascular complaints.  She is traveling for the holidays..    Most recent imaging study consisted of a CTA performed on 2024 which demonstrated normal appearance of the thoracic aorta without evidence of aneurysmal dilatation  Past Medical History  No past medical history on file.  Past Surgical History  No past surgical history on file.  Social History  Social History     Socioeconomic History    Marital status:      Spouse name: Not on file    Number of children: Not on file    Years of education: Not on file    Highest education level: Not on file   Occupational History    Not on file   Tobacco Use    Smoking status: Never    Smokeless tobacco: Never   Substance and Sexual Activity    Alcohol use: Yes     Comment: Occ    Drug use: Never    Sexual activity: Not on file   Other Topics Concern    Not on file   Social History Narrative    Not on file     Social Drivers of Health     Financial Resource Strain: Not on file   Food Insecurity: Not on file   Transportation Needs: Not on  "file   Physical Activity: Not on file   Stress: Not on file   Social Connections: Not on file   Intimate Partner Violence: Not on file   Housing Stability: Not on file     Past Family History  No family history on file.  Medication(s)    Current Outpatient Medications:     Xdemvy, 25 %, Ophthalmic, BID, Taking    timolol gel-forming, 1 Drop, Both Eyes, BID, Taking  Allergies  Ampicillin and Sulfa drugs    Review of Systems    A comprehensive 10 system review was conducted and is negative except as noted above in the HPI or here.      Vital Signs  /70 (BP Location: Left arm, Patient Position: Sitting, BP Cuff Size: Adult)   Pulse 98   Resp 16   Ht 1.651 m (5' 5\")   Wt 61.7 kg (136 lb)   SpO2 (!) 53%   BMI 22.63 kg/m²     Physical Exam  Vitals and nursing note reviewed.   Constitutional:       Appearance: Normal appearance.   HENT:      Head: Normocephalic and atraumatic.      Nose: Nose normal.      Mouth/Throat:      Mouth: Mucous membranes are moist.      Pharynx: Oropharynx is clear.   Eyes:      Pupils: Pupils are equal, round, and reactive to light.   Cardiovascular:      Rate and Rhythm: Normal rate and regular rhythm.      Heart sounds: No murmur heard.     No friction rub. No gallop.   Pulmonary:      Effort: Pulmonary effort is normal.      Breath sounds: Normal breath sounds.   Abdominal:      General: Bowel sounds are normal.      Palpations: Abdomen is soft.   Musculoskeletal:         General: Normal range of motion.      Cervical back: Normal range of motion and neck supple.   Skin:     General: Skin is warm and dry.   Neurological:      General: No focal deficit present.      Mental Status: She is alert and oriented to person, place, and time. Mental status is at baseline.   Psychiatric:         Mood and Affect: Mood normal.         Behavior: Behavior normal.         Thought Content: Thought content normal.         Judgment: Judgment normal.         Lab Results   Component Value Date/Time    " "MARCIN 1.390 10/06/2011 1327      No results found for: \"FREET4\"   No results found for: \"HBA1C\"  No results found for: \"CHOLSTRLTOT\", \"LDL\", \"HDL\", \"TRIGLYCERIDE\"      Lab Results   Component Value Date/Time    SODIUM 138 11/08/2024 03:33 PM    POTASSIUM 4.0 11/08/2024 03:33 PM    CHLORIDE 103 11/08/2024 03:33 PM    CO2 25 11/08/2024 03:33 PM    GLUCOSE 107 (H) 11/08/2024 03:33 PM    BUN 20 11/08/2024 03:33 PM    CREATININE 0.77 11/08/2024 03:33 PM    GLOMRATE 79 05/17/2023 10:49 AM       Lab Results   Component Value Date/Time    ALKPHOSPHAT 81 06/22/2023 08:50 PM    ASTSGOT 25 06/22/2023 08:50 PM    ALTSGPT 27 06/22/2023 08:50 PM    TBILIRUBIN 0.2 06/22/2023 08:50 PM         Total patient time was estimated to be 20 minutes consisting of chart review, direct patient interaction, medication renewal, plan development and overall communication with the cardiovascular team.        Electronically signed by:   Benjamin Roberto DO, MPH  [facility]    Portions of this note were completed using voice recognition software (Dragon Naturally speaking software) . Occasional transcription errors may have escaped proof reading. I have made every reasonable attempt to correct obvious errors, but I expect that there are errors of grammar and possibly content that I did not discover before finalizing the note.      "

## 2025-07-20 ENCOUNTER — APPOINTMENT (OUTPATIENT)
Dept: RADIOLOGY | Facility: MEDICAL CENTER | Age: 70
End: 2025-07-20
Attending: EMERGENCY MEDICINE
Payer: MEDICARE

## 2025-07-20 ENCOUNTER — HOSPITAL ENCOUNTER (EMERGENCY)
Facility: MEDICAL CENTER | Age: 70
End: 2025-07-20
Attending: EMERGENCY MEDICINE
Payer: MEDICARE

## 2025-07-20 VITALS
WEIGHT: 134.48 LBS | BODY MASS INDEX: 22.41 KG/M2 | OXYGEN SATURATION: 96 % | HEIGHT: 65 IN | RESPIRATION RATE: 12 BRPM | DIASTOLIC BLOOD PRESSURE: 71 MMHG | HEART RATE: 56 BPM | SYSTOLIC BLOOD PRESSURE: 141 MMHG | TEMPERATURE: 97.2 F

## 2025-07-20 DIAGNOSIS — R19.00 PELVIC MASS: ICD-10-CM

## 2025-07-20 DIAGNOSIS — R33.8 ACUTE URINARY RETENTION: Primary | ICD-10-CM

## 2025-07-20 LAB
ALBUMIN SERPL BCP-MCNC: 4.3 G/DL (ref 3.2–4.9)
ALBUMIN/GLOB SERPL: 1.9 G/DL
ALP SERPL-CCNC: 58 U/L (ref 30–99)
ALT SERPL-CCNC: 18 U/L (ref 2–50)
ANION GAP SERPL CALC-SCNC: 13 MMOL/L (ref 7–16)
APPEARANCE UR: CLEAR
AST SERPL-CCNC: 19 U/L (ref 12–45)
BACTERIA #/AREA URNS HPF: ABNORMAL /HPF
BASOPHILS # BLD AUTO: 0.5 % (ref 0–1.8)
BASOPHILS # BLD: 0.05 K/UL (ref 0–0.12)
BILIRUB SERPL-MCNC: 0.3 MG/DL (ref 0.1–1.5)
BILIRUB UR QL STRIP.AUTO: NEGATIVE
BUN SERPL-MCNC: 18 MG/DL (ref 8–22)
CALCIUM ALBUM COR SERPL-MCNC: 9.5 MG/DL (ref 8.5–10.5)
CALCIUM SERPL-MCNC: 9.7 MG/DL (ref 8.5–10.5)
CASTS URNS QL MICRO: ABNORMAL /LPF (ref 0–2)
CHLORIDE SERPL-SCNC: 100 MMOL/L (ref 96–112)
CO2 SERPL-SCNC: 23 MMOL/L (ref 20–33)
COLOR UR: YELLOW
CREAT SERPL-MCNC: 0.93 MG/DL (ref 0.5–1.4)
EOSINOPHIL # BLD AUTO: 0.12 K/UL (ref 0–0.51)
EOSINOPHIL NFR BLD: 1.2 % (ref 0–6.9)
EPITHELIAL CELLS 1715: ABNORMAL /HPF (ref 0–5)
ERYTHROCYTE [DISTWIDTH] IN BLOOD BY AUTOMATED COUNT: 41.5 FL (ref 35.9–50)
GFR SERPLBLD CREATININE-BSD FMLA CKD-EPI: 66 ML/MIN/1.73 M 2
GLOBULIN SER CALC-MCNC: 2.3 G/DL (ref 1.9–3.5)
GLUCOSE SERPL-MCNC: 118 MG/DL (ref 65–99)
GLUCOSE UR STRIP.AUTO-MCNC: NEGATIVE MG/DL
HCT VFR BLD AUTO: 40 % (ref 37–47)
HGB BLD-MCNC: 13.2 G/DL (ref 12–16)
IMM GRANULOCYTES # BLD AUTO: 0.04 K/UL (ref 0–0.11)
IMM GRANULOCYTES NFR BLD AUTO: 0.4 % (ref 0–0.9)
KETONES UR STRIP.AUTO-MCNC: NEGATIVE MG/DL
LEUKOCYTE ESTERASE UR QL STRIP.AUTO: ABNORMAL
LIPASE SERPL-CCNC: 25 U/L (ref 11–82)
LYMPHOCYTES # BLD AUTO: 1.21 K/UL (ref 1–4.8)
LYMPHOCYTES NFR BLD: 12 % (ref 22–41)
MCH RBC QN AUTO: 30.5 PG (ref 27–33)
MCHC RBC AUTO-ENTMCNC: 33 G/DL (ref 32.2–35.5)
MCV RBC AUTO: 92.4 FL (ref 81.4–97.8)
MICRO URNS: ABNORMAL
MONOCYTES # BLD AUTO: 0.7 K/UL (ref 0–0.85)
MONOCYTES NFR BLD AUTO: 6.9 % (ref 0–13.4)
NEUTROPHILS # BLD AUTO: 7.98 K/UL (ref 1.82–7.42)
NEUTROPHILS NFR BLD: 79 % (ref 44–72)
NITRITE UR QL STRIP.AUTO: NEGATIVE
NRBC # BLD AUTO: 0 K/UL
NRBC BLD-RTO: 0 /100 WBC (ref 0–0.2)
PH UR STRIP.AUTO: 7.5 [PH] (ref 5–8)
PLATELET # BLD AUTO: 313 K/UL (ref 164–446)
PMV BLD AUTO: 9.7 FL (ref 9–12.9)
POTASSIUM SERPL-SCNC: 4.2 MMOL/L (ref 3.6–5.5)
PROT SERPL-MCNC: 6.6 G/DL (ref 6–8.2)
PROT UR QL STRIP: NEGATIVE MG/DL
RBC # BLD AUTO: 4.33 M/UL (ref 4.2–5.4)
RBC # URNS HPF: ABNORMAL /HPF
RBC UR QL AUTO: NEGATIVE
SODIUM SERPL-SCNC: 136 MMOL/L (ref 135–145)
SP GR UR STRIP.AUTO: 1.01
UROBILINOGEN UR STRIP.AUTO-MCNC: 1 EU/DL
WBC # BLD AUTO: 10.1 K/UL (ref 4.8–10.8)
WBC #/AREA URNS HPF: ABNORMAL /HPF

## 2025-07-20 PROCEDURE — 83690 ASSAY OF LIPASE: CPT

## 2025-07-20 PROCEDURE — 700117 HCHG RX CONTRAST REV CODE 255: Performed by: EMERGENCY MEDICINE

## 2025-07-20 PROCEDURE — 96375 TX/PRO/DX INJ NEW DRUG ADDON: CPT

## 2025-07-20 PROCEDURE — 74177 CT ABD & PELVIS W/CONTRAST: CPT

## 2025-07-20 PROCEDURE — 81001 URINALYSIS AUTO W/SCOPE: CPT

## 2025-07-20 PROCEDURE — 99285 EMERGENCY DEPT VISIT HI MDM: CPT

## 2025-07-20 PROCEDURE — 96374 THER/PROPH/DIAG INJ IV PUSH: CPT

## 2025-07-20 PROCEDURE — 51798 US URINE CAPACITY MEASURE: CPT

## 2025-07-20 PROCEDURE — 51702 INSERT TEMP BLADDER CATH: CPT

## 2025-07-20 PROCEDURE — 700111 HCHG RX REV CODE 636 W/ 250 OVERRIDE (IP): Mod: JZ | Performed by: EMERGENCY MEDICINE

## 2025-07-20 PROCEDURE — 36415 COLL VENOUS BLD VENIPUNCTURE: CPT

## 2025-07-20 PROCEDURE — 85025 COMPLETE CBC W/AUTO DIFF WBC: CPT

## 2025-07-20 PROCEDURE — 303105 HCHG CATHETER EXTRA

## 2025-07-20 PROCEDURE — 80053 COMPREHEN METABOLIC PANEL: CPT

## 2025-07-20 RX ORDER — ONDANSETRON 2 MG/ML
4 INJECTION INTRAMUSCULAR; INTRAVENOUS ONCE
Status: COMPLETED | OUTPATIENT
Start: 2025-07-20 | End: 2025-07-20

## 2025-07-20 RX ORDER — CEPHALEXIN 500 MG/1
500 CAPSULE ORAL 4 TIMES DAILY
Qty: 28 CAPSULE | Refills: 0 | Status: ACTIVE | OUTPATIENT
Start: 2025-07-20 | End: 2025-07-27

## 2025-07-20 RX ORDER — TIMOLOL MALEATE 5 MG/ML
1 SOLUTION/ DROPS OPHTHALMIC 2 TIMES DAILY
COMMUNITY

## 2025-07-20 RX ORDER — HYDROCODONE BITARTRATE AND ACETAMINOPHEN 5; 325 MG/1; MG/1
1 TABLET ORAL EVERY 4 HOURS PRN
Qty: 20 TABLET | Refills: 0 | Status: SHIPPED | OUTPATIENT
Start: 2025-07-20 | End: 2025-07-30

## 2025-07-20 RX ORDER — MORPHINE SULFATE 4 MG/ML
2 INJECTION INTRAVENOUS ONCE
Status: COMPLETED | OUTPATIENT
Start: 2025-07-20 | End: 2025-07-20

## 2025-07-20 RX ORDER — IBUPROFEN 200 MG
200-400 TABLET ORAL EVERY 6 HOURS PRN
COMMUNITY

## 2025-07-20 RX ORDER — MORPHINE SULFATE 4 MG/ML
4 INJECTION INTRAVENOUS ONCE
Status: DISCONTINUED | OUTPATIENT
Start: 2025-07-20 | End: 2025-07-20

## 2025-07-20 RX ADMIN — ONDANSETRON 4 MG: 2 INJECTION INTRAMUSCULAR; INTRAVENOUS at 09:46

## 2025-07-20 RX ADMIN — IOHEXOL 100 ML: 350 INJECTION, SOLUTION INTRAVENOUS at 10:45

## 2025-07-20 RX ADMIN — MORPHINE SULFATE 2 MG: 4 INJECTION, SOLUTION INTRAMUSCULAR; INTRAVENOUS at 09:46

## 2025-07-20 ASSESSMENT — PAIN DESCRIPTION - PAIN TYPE: TYPE: ACUTE PAIN

## 2025-07-20 NOTE — ED NOTES
Pt ambulates. For the last month has had pelvic pain, that radiates to the flank.  Worsens in the morning can not sit. No previous physical trauma.

## 2025-07-20 NOTE — ED NOTES
Provided DC instructions, Rx info, outpt f/u with Dr Laws, PIV removed, discharged with johnson in place, attached leg bag, questions answered.

## 2025-07-20 NOTE — ED TRIAGE NOTES
"Chief Complaint   Patient presents with    Abdominal Pain     Lower abd pain that is worse in the am. With associated urinary frequency and urgency. States PMH nephrolithiases but this feels different.     Blood Pressure : (!) 164/102, Pulse: (!) 55, Respiration: 14, Temperature: 36.2 °C (97.2 °F), Height: 165.1 cm (5' 5\"), Weight: 61 kg (134 lb 7.7 oz), BMI (Calculated): 22.38, BSA (Calculated): 1.7, Pulse Oximetry: 98 %, O2 Delivery Device: None - Room Air  "

## 2025-07-20 NOTE — ED NOTES
Medication history reviewed with pt. Med rec is complete.  Allergies reviewed, per pt  Interviewed pt with  at bedside with permission from pt.    Pt reports no vitamins in the last 30 days or longer.    Any outpatient anti-MICROBIAL in the last 30 days?  NO    Pt is not on any anticoagulants

## 2025-07-20 NOTE — DISCHARGE INSTRUCTIONS
Follow-up with Dr. Laws this week for reevaluation and further workup of pelvic mass.    Keep Spence catheter in place, empty leg bag as needed.    Keflex 4 times daily while Spence catheter is in place.  Norco every 6 hours as needed for breakthrough pain.  Continue other home medications as previously indicated.    Diet  and light activity as tolerated.    Return to the emergency department for persistent worsening abdominal pain or distention, bloody urine, fever, vomiting or other new concerns.

## 2025-07-20 NOTE — ED PROVIDER NOTES
ED Provider Note    CHIEF COMPLAINT  Chief Complaint   Patient presents with    Abdominal Pain     Lower abd pain that is worse in the am. With associated urinary frequency and urgency. States PMH nephrolithiases but this feels different.       EXTERNAL RECORDS REVIEWED  Outpatient Notes most recent outpatient evaluations with ophthalmology and orthopedics, noncontributory    HPI/ROS  LIMITATION TO HISTORY   Select: : None  OUTSIDE HISTORIAN(S):  Family has been    Jerad Rodriguez is a 69 y.o. female who presents to the emergency department through triage with her  for lower abdominal pain, pelvic pain.  Patient describes stuttering course of such over the last couple of months but significantly worse lately.  Low abdominal distention and urinary retention.  She has had some urinary hesitation but now unable to urinate.  She has had some constipation lately as well.  Has worked with a pelvic  in the past and tried again but continues to have discomfort.    No fever or chills.  No nausea or vomiting.  No bloody stools.  No lower extremity weakness or paresthesias.    PAST MEDICAL HISTORY   Denies    SURGICAL HISTORY  patient denies any surgical history    FAMILY HISTORY  History reviewed. No pertinent family history.    SOCIAL HISTORY  Social History     Tobacco Use    Smoking status: Never    Smokeless tobacco: Never   Vaping Use    Vaping status: Never Used   Substance and Sexual Activity    Alcohol use: Not Currently     Comment: Occ    Drug use: Never    Sexual activity: Not on file       CURRENT MEDICATIONS  Home Medications       Reviewed by Thao Perez (Pharmacy Tech) on 07/20/25 at 0853  Med List Status: Complete     Medication Last Dose Status   ibuprofen (MOTRIN) 200 MG Tab 7/20/2025 Active   timolol (TIMOPTIC) 0.5 % Solution 7/20/2025 Active                    ALLERGIES  Allergies[1]    PHYSICAL EXAM  VITAL SIGNS: BP (!) 141/71   Pulse (!) 56   Temp 36.2 °C (97.2 °F)  "(Temporal)   Resp 12   Ht 1.651 m (5' 5\")   Wt 61 kg (134 lb 7.7 oz)   SpO2 96%   BMI 22.38 kg/m²    Pulse ox interpretation: I interpret this pulse ox as normal.  Constitutional: Alert.  Moderate distress secondary to discomfort  HENT: Normocephalic, atraumatic. Bilateral external ears normal, Nose normal. Moist mucous membranes.    Eyes: Pupils are equal and reactive, Conjunctiva normal.   Neck: Normal range of motion, Supple, non-tender. No stridor.   Lymphatic: No lymphadenopathy noted.   Cardiovascular: Normal peripheral perfusion.  Distal pulses intact.  No peripheral edema  Thorax & Lungs: Nonlabored respirations  Abdomen: Soft.  Distended across the low abdomen, palpable mass of bladder.  Guarded.  No CVA tenderness percussion.  : Normal external genitalia.  Normal rectum.  No evidence for prolapse  Skin: Warm, Dry, No erythema, No rash.   Musculoskeletal: Good range of motion in all major joints.   Neurologic: Alert and orient x 4.  Speech clear and cohesive.  Moves 4 extremity spontaneously  Psychiatric: Affect normal, Judgment normal, Mood normal.       EKG/LABS  Results for orders placed or performed during the hospital encounter of 07/20/25   Urinalysis, Culture if Indicated    Collection Time: 07/20/25  8:23 AM    Specimen: Urine, Clean Catch   Result Value Ref Range    Color Yellow     Character Clear     Specific Gravity 1.009 <1.035    Ph 7.5 5.0 - 8.0    Glucose Negative Negative mg/dL    Ketones Negative Negative mg/dL    Protein Negative Negative mg/dL    Bilirubin Negative Negative    Urobilinogen, Urine 1.0 <=1.0 EU/dL    Nitrite Negative Negative    Leukocyte Esterase Moderate (A) Negative    Occult Blood Negative Negative    Micro Urine Req Microscopic    URINE MICROSCOPIC (W/UA)    Collection Time: 07/20/25  8:23 AM   Result Value Ref Range    WBC 21-50 (A) /hpf    RBC 3-5 (A) /hpf    Bacteria Rare (A) None /hpf    Epithelial Cells 6-10 (A) 0 - 5 /hpf    Urine Casts 0-2 0 - 2 /lpf "   CBC WITH DIFFERENTIAL    Collection Time: 07/20/25  9:40 AM   Result Value Ref Range    WBC 10.1 4.8 - 10.8 K/uL    RBC 4.33 4.20 - 5.40 M/uL    Hemoglobin 13.2 12.0 - 16.0 g/dL    Hematocrit 40.0 37.0 - 47.0 %    MCV 92.4 81.4 - 97.8 fL    MCH 30.5 27.0 - 33.0 pg    MCHC 33.0 32.2 - 35.5 g/dL    RDW 41.5 35.9 - 50.0 fL    Platelet Count 313 164 - 446 K/uL    MPV 9.7 9.0 - 12.9 fL    Neutrophils-Polys 79.00 (H) 44.00 - 72.00 %    Lymphocytes 12.00 (L) 22.00 - 41.00 %    Monocytes 6.90 0.00 - 13.40 %    Eosinophils 1.20 0.00 - 6.90 %    Basophils 0.50 0.00 - 1.80 %    Immature Granulocytes 0.40 0.00 - 0.90 %    Nucleated RBC 0.00 0.00 - 0.20 /100 WBC    Neutrophils (Absolute) 7.98 (H) 1.82 - 7.42 K/uL    Lymphs (Absolute) 1.21 1.00 - 4.80 K/uL    Monos (Absolute) 0.70 0.00 - 0.85 K/uL    Eos (Absolute) 0.12 0.00 - 0.51 K/uL    Baso (Absolute) 0.05 0.00 - 0.12 K/uL    Immature Granulocytes (abs) 0.04 0.00 - 0.11 K/uL    NRBC (Absolute) 0.00 K/uL   COMP METABOLIC PANEL    Collection Time: 07/20/25  9:40 AM   Result Value Ref Range    Sodium 136 135 - 145 mmol/L    Potassium 4.2 3.6 - 5.5 mmol/L    Chloride 100 96 - 112 mmol/L    Co2 23 20 - 33 mmol/L    Anion Gap 13.0 7.0 - 16.0    Glucose 118 (H) 65 - 99 mg/dL    Bun 18 8 - 22 mg/dL    Creatinine 0.93 0.50 - 1.40 mg/dL    Calcium 9.7 8.5 - 10.5 mg/dL    Correct Calcium 9.5 8.5 - 10.5 mg/dL    AST(SGOT) 19 12 - 45 U/L    ALT(SGPT) 18 2 - 50 U/L    Alkaline Phosphatase 58 30 - 99 U/L    Total Bilirubin 0.3 0.1 - 1.5 mg/dL    Albumin 4.3 3.2 - 4.9 g/dL    Total Protein 6.6 6.0 - 8.2 g/dL    Globulin 2.3 1.9 - 3.5 g/dL    A-G Ratio 1.9 g/dL   LIPASE    Collection Time: 07/20/25  9:40 AM   Result Value Ref Range    Lipase 25 11 - 82 U/L   ESTIMATED GFR    Collection Time: 07/20/25  9:40 AM   Result Value Ref Range    GFR (CKD-EPI) 66 >60 mL/min/1.73 m 2         RADIOLOGY/PROCEDURES       Radiologist interpretation:  CT-ABDOMEN-PELVIS WITH   Final Result         1. Severe  bilateral hydronephrosis, likely from the pelvic mass. No visible stones. Bladder empty with Spence catheter.      2. 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 This was not present on 1/15/2024 CT.      3. No ascites or adenopathy. No metastatic lesions evident.                      COURSE & MEDICAL DECISION MAKING    ASSESSMENT, COURSE AND PLAN  Care Narrative:   Seen evaluated at bedside.  Obvious discomfort.  Distended abdomen with acute urinary retention.  Add bladder scan.  Add CT abdomen and pelvis, add labs, add pain control.    Bladder scan confirms significant retention.  Add Spence catheter.    Patient feeling better with Spence catheter placement, greater than 500 cc output.    Labs are  fairly unrevealing.  No leukocytosis or bandemia.  No anemia.  No electrolyte derangement.  Renal function is preserved.  Urinalysis suggest infection with moderate leuk esterase, WBCs and bacteria.    CT as above with a large 16 cm complex cystic and solid pelvic mass likely ovarian tumor which could be malignant.  No ascites or adenopathy.  Also severe bilateral hydronephrosis likely from the pelvic mass.  No visible stones.  Bladder empty with Spence catheter in place.    Patient is aware of CT findings.  Feeling much better overall although bladder mass is still palpable on abdominal exam, no guarding, minimal tenderness.    ADDITIONAL PROBLEMS MANAGED      DISPOSITION AND DISCUSSIONS  ED evaluation for abdominal pain, distention, urinary tension constipation likely secondary to the newly identified large pelvic mass, likely of ovarian origin.  Patient feels much better after Spence catheter placement and greater than 500 cc urine output.  Labs are unremarkable and despite hydronephrosis her renal function is preserved.  Urinalysis does suggest infection with Spence catheter placement which will stay upon discharge will start Keflex for 5 days.  Will assist with  arrangement of Anu Echo follow-up this week.  Pain control will be added for discharge.  Otherwise hemodynamically stable, abdomen is tender but without peritonitis and she is hemodynamically stable.  Aware of the findings and she and her  are agreeable to the disposition plan.    Discussion of management with specialty physician:  1135 -Dr. Laws is aware of patient presentation and CT findings.  Very agreeable to outpatient consultation this week.  Her demographics and facesheet have been sent to him via Vo[employer].     Escalation of care considered, and ultimately not performed:acute inpatient care management, however at this time, the patient is most appropriate for outpatient management close outpatient follow-up has been secured    Barriers to care at this time, including but not limited to: None.     Decision tools and prescription drugs considered including, but not limited to: Pain Medications small quantity for new pelvic mass.    In prescribing controlled substances to this patient, I certify that I have obtained and reviewed the medical history of Jerad Rodriguez. I have also made a good yeni effort to obtain applicable records from other providers who have treated the patient and records did not demonstrate any increased risk of substance abuse that would prevent me from prescribing controlled substances.     I have conducted a physical exam and documented it. I have reviewed Ms. Rodriguez’s prescription history as maintained by the [place] Prescription Monitoring Program.     I have assessed the patient’s risk for abuse, dependency, and addiction using the validated Opioid Risk Tool available at https://www.mdcalc.com/apsuun-igyu-ludi-ort-narcotic-abuse.     Given the above, I believe the benefits of controlled substance therapy outweigh the risks. The reasons for prescribing controlled substances include non-narcotic, oral analgesic alternatives have been inadequate for pain control. Accordingly, I have  discussed the risk and benefits, treatment plan, and alternative therapies with the patient.     The patient is stable for discharge home, anticipatory guidance and Spence catheter instructions provided, Keflex for 5 days, Norco for breakthrough pain, close follow-up is encouraged and strict return instructions have been discussed. Patient and her  are agreeable to the disposition and plan.      FINAL DIAGNOSIS  1. Acute urinary retention    2. Pelvic mass         Electronically signed by: Meri Valle D.O., 7/20/2025 11:42 AM           [1]   Allergies  Allergen Reactions    Ampicillin Rash     Itching, hives    Sulfa Drugs Hives and Itching

## 2025-07-22 ENCOUNTER — APPOINTMENT (OUTPATIENT)
Dept: ADMISSIONS | Facility: MEDICAL CENTER | Age: 70
End: 2025-07-22
Attending: SPECIALIST
Payer: MEDICARE

## 2025-07-24 ENCOUNTER — PRE-ADMISSION TESTING (OUTPATIENT)
Dept: ADMISSIONS | Facility: MEDICAL CENTER | Age: 70
End: 2025-07-24
Attending: SPECIALIST
Payer: MEDICARE

## 2025-07-24 NOTE — PREADMIT AVS NOTE
Current Medications   Medication Instructions    ibuprofen (MOTRIN) 200 MG Tab HOLD 5 DAYS PRIOR TO SURGERY, UNLESS INSTRUCTED BY SURGEON.    timolol (TIMOPTIC) 0.5 % Solution CONTINUE TAKING MEDICATION AS PRESCRIBED.     HYDROcodone-acetaminophen (NORCO) 5-325 MG Tab per tablet CONTINUE TAKING MEDICATION AS PRESCRIBED.     cephALEXin (KEFLEX) 500 MG Cap FOLLOW INSTRUCTIONS FROM SURGEON OR SPECIALIST

## 2025-07-25 ENCOUNTER — HOSPITAL ENCOUNTER (OUTPATIENT)
Dept: RADIOLOGY | Facility: MEDICAL CENTER | Age: 70
End: 2025-07-25
Attending: SPECIALIST | Admitting: SPECIALIST
Payer: MEDICARE

## 2025-07-25 ENCOUNTER — PRE-ADMISSION TESTING (OUTPATIENT)
Dept: ADMISSIONS | Facility: MEDICAL CENTER | Age: 70
End: 2025-07-25
Attending: SPECIALIST
Payer: MEDICARE

## 2025-07-25 PROCEDURE — 71045 X-RAY EXAM CHEST 1 VIEW: CPT

## 2025-07-29 ENCOUNTER — ANESTHESIA (OUTPATIENT)
Dept: SURGERY | Facility: MEDICAL CENTER | Age: 70
End: 2025-07-29
Payer: MEDICARE

## 2025-07-29 ENCOUNTER — APPOINTMENT (OUTPATIENT)
Dept: RADIOLOGY | Facility: MEDICAL CENTER | Age: 70
End: 2025-07-29
Attending: STUDENT IN AN ORGANIZED HEALTH CARE EDUCATION/TRAINING PROGRAM
Payer: MEDICARE

## 2025-07-29 ENCOUNTER — ANESTHESIA EVENT (OUTPATIENT)
Dept: SURGERY | Facility: MEDICAL CENTER | Age: 70
End: 2025-07-29
Payer: MEDICARE

## 2025-07-29 ENCOUNTER — HOSPITAL ENCOUNTER (OUTPATIENT)
Facility: MEDICAL CENTER | Age: 70
End: 2025-07-29
Attending: SPECIALIST | Admitting: SPECIALIST
Payer: MEDICARE

## 2025-07-29 PROCEDURE — 700101 HCHG RX REV CODE 250: Performed by: ANESTHESIOLOGY

## 2025-07-29 PROCEDURE — 700111 HCHG RX REV CODE 636 W/ 250 OVERRIDE (IP): Mod: JZ | Performed by: ANESTHESIOLOGY

## 2025-07-29 PROCEDURE — 700105 HCHG RX REV CODE 258: Performed by: ANESTHESIOLOGY

## 2025-07-29 RX ORDER — SODIUM CHLORIDE, SODIUM LACTATE, POTASSIUM CHLORIDE, CALCIUM CHLORIDE 600; 310; 30; 20 MG/100ML; MG/100ML; MG/100ML; MG/100ML
INJECTION, SOLUTION INTRAVENOUS
Status: DISCONTINUED | OUTPATIENT
Start: 2025-07-29 | End: 2025-07-29 | Stop reason: SURG

## 2025-07-29 RX ORDER — LIDOCAINE HYDROCHLORIDE 20 MG/ML
INJECTION, SOLUTION EPIDURAL; INFILTRATION; INTRACAUDAL; PERINEURAL PRN
Status: DISCONTINUED | OUTPATIENT
Start: 2025-07-29 | End: 2025-07-29 | Stop reason: SURG

## 2025-07-29 RX ORDER — KETOROLAC TROMETHAMINE 15 MG/ML
INJECTION, SOLUTION INTRAMUSCULAR; INTRAVENOUS PRN
Status: DISCONTINUED | OUTPATIENT
Start: 2025-07-29 | End: 2025-07-29 | Stop reason: SURG

## 2025-07-29 RX ORDER — DEXAMETHASONE SODIUM PHOSPHATE 4 MG/ML
INJECTION, SOLUTION INTRA-ARTICULAR; INTRALESIONAL; INTRAMUSCULAR; INTRAVENOUS; SOFT TISSUE PRN
Status: DISCONTINUED | OUTPATIENT
Start: 2025-07-29 | End: 2025-07-29 | Stop reason: SURG

## 2025-07-29 RX ORDER — CEFAZOLIN SODIUM 1 G/3ML
INJECTION, POWDER, FOR SOLUTION INTRAMUSCULAR; INTRAVENOUS PRN
Status: DISCONTINUED | OUTPATIENT
Start: 2025-07-29 | End: 2025-07-29 | Stop reason: SURG

## 2025-07-29 RX ORDER — DEXMEDETOMIDINE HYDROCHLORIDE 100 UG/ML
INJECTION, SOLUTION INTRAVENOUS PRN
Status: DISCONTINUED | OUTPATIENT
Start: 2025-07-29 | End: 2025-07-29 | Stop reason: SURG

## 2025-07-29 RX ORDER — EPHEDRINE SULFATE 50 MG/ML
INJECTION, SOLUTION INTRAVENOUS PRN
Status: DISCONTINUED | OUTPATIENT
Start: 2025-07-29 | End: 2025-07-29 | Stop reason: SURG

## 2025-07-29 RX ORDER — METRONIDAZOLE 500 MG/100ML
INJECTION, SOLUTION INTRAVENOUS PRN
Status: DISCONTINUED | OUTPATIENT
Start: 2025-07-29 | End: 2025-07-29 | Stop reason: SURG

## 2025-07-29 RX ORDER — ONDANSETRON 2 MG/ML
INJECTION INTRAMUSCULAR; INTRAVENOUS PRN
Status: DISCONTINUED | OUTPATIENT
Start: 2025-07-29 | End: 2025-07-29 | Stop reason: SURG

## 2025-07-29 RX ORDER — MIDAZOLAM HYDROCHLORIDE 1 MG/ML
INJECTION INTRAMUSCULAR; INTRAVENOUS PRN
Status: DISCONTINUED | OUTPATIENT
Start: 2025-07-29 | End: 2025-07-29 | Stop reason: SURG

## 2025-07-29 RX ORDER — ROCURONIUM BROMIDE 10 MG/ML
INJECTION, SOLUTION INTRAVENOUS PRN
Status: DISCONTINUED | OUTPATIENT
Start: 2025-07-29 | End: 2025-07-29 | Stop reason: SURG

## 2025-07-29 RX ADMIN — DEXMEDETOMIDINE 10 MCG: 100 INJECTION, SOLUTION INTRAVENOUS at 12:41

## 2025-07-29 RX ADMIN — FENTANYL CITRATE 50 MCG: 50 INJECTION, SOLUTION INTRAMUSCULAR; INTRAVENOUS at 15:59

## 2025-07-29 RX ADMIN — ROCURONIUM BROMIDE 10 MG: 10 INJECTION INTRAVENOUS at 13:20

## 2025-07-29 RX ADMIN — ROCURONIUM BROMIDE 20 MG: 10 INJECTION INTRAVENOUS at 12:25

## 2025-07-29 RX ADMIN — ROCURONIUM BROMIDE 10 MG: 10 INJECTION INTRAVENOUS at 12:54

## 2025-07-29 RX ADMIN — ONDANSETRON 4 MG: 2 INJECTION INTRAMUSCULAR; INTRAVENOUS at 15:55

## 2025-07-29 RX ADMIN — ROCURONIUM BROMIDE 50 MG: 10 INJECTION INTRAVENOUS at 10:53

## 2025-07-29 RX ADMIN — LIDOCAINE HYDROCHLORIDE 60 MG: 20 INJECTION, SOLUTION EPIDURAL; INFILTRATION; INTRACAUDAL; PERINEURAL at 10:53

## 2025-07-29 RX ADMIN — ROCURONIUM BROMIDE 10 MG: 10 INJECTION INTRAVENOUS at 14:30

## 2025-07-29 RX ADMIN — PROPOFOL 120 MG: 10 INJECTION, EMULSION INTRAVENOUS at 10:53

## 2025-07-29 RX ADMIN — SUGAMMADEX 120 MG: 100 INJECTION, SOLUTION INTRAVENOUS at 15:59

## 2025-07-29 RX ADMIN — FENTANYL CITRATE 50 MCG: 50 INJECTION, SOLUTION INTRAMUSCULAR; INTRAVENOUS at 10:50

## 2025-07-29 RX ADMIN — EPHEDRINE SULFATE 5 MG: 50 INJECTION, SOLUTION INTRAVENOUS at 16:05

## 2025-07-29 RX ADMIN — CEFAZOLIN 2 G: 1 INJECTION, POWDER, FOR SOLUTION INTRAMUSCULAR; INTRAVENOUS at 11:19

## 2025-07-29 RX ADMIN — DEXMEDETOMIDINE 10 MCG: 100 INJECTION, SOLUTION INTRAVENOUS at 11:51

## 2025-07-29 RX ADMIN — KETOROLAC TROMETHAMINE 15 MG: 15 INJECTION, SOLUTION INTRAMUSCULAR; INTRAVENOUS at 15:55

## 2025-07-29 RX ADMIN — DEXMEDETOMIDINE 10 MCG: 100 INJECTION, SOLUTION INTRAVENOUS at 12:00

## 2025-07-29 RX ADMIN — METRONIDAZOLE 500 MG: 5 INJECTION, SOLUTION INTRAVENOUS at 11:19

## 2025-07-29 RX ADMIN — SODIUM CHLORIDE, POTASSIUM CHLORIDE, SODIUM LACTATE AND CALCIUM CHLORIDE: 600; 310; 30; 20 INJECTION, SOLUTION INTRAVENOUS at 10:46

## 2025-07-29 RX ADMIN — ROCURONIUM BROMIDE 20 MG: 10 INJECTION INTRAVENOUS at 11:49

## 2025-07-29 RX ADMIN — ROCURONIUM BROMIDE 10 MG: 10 INJECTION INTRAVENOUS at 15:10

## 2025-07-29 RX ADMIN — DEXAMETHASONE SODIUM PHOSPHATE 8 MG: 4 INJECTION INTRA-ARTICULAR; INTRALESIONAL; INTRAMUSCULAR; INTRAVENOUS; SOFT TISSUE at 11:03

## 2025-07-29 RX ADMIN — MIDAZOLAM HYDROCHLORIDE 2 MG: 1 INJECTION, SOLUTION INTRAMUSCULAR; INTRAVENOUS at 10:48

## 2025-07-29 RX ADMIN — DEXMEDETOMIDINE 10 MCG: 100 INJECTION, SOLUTION INTRAVENOUS at 12:15

## 2025-07-29 RX ADMIN — CEFAZOLIN 1 G: 1 INJECTION, POWDER, FOR SOLUTION INTRAMUSCULAR; INTRAVENOUS at 15:05

## 2025-07-29 RX ADMIN — ROCURONIUM BROMIDE 10 MG: 10 INJECTION INTRAVENOUS at 13:55

## 2025-07-29 ASSESSMENT — PAIN DESCRIPTION - PAIN TYPE
TYPE: SURGICAL PAIN

## 2025-07-29 ASSESSMENT — FIBROSIS 4 INDEX: FIB4 SCORE: 1

## 2025-07-29 NOTE — OR SURGEON
Immediate Post OP Note    PreOp Diagnosis: 15 cm complex ovarian cyst rule out ovarian cancer   40.6  Bilateral hydronephrosis secondary to compression of the pelvic mass      PostOp Diagnosis: Same as above  Stage Ia grade 3 endometrioid adenocarcinoma versus stage III  Status post optimal cytoreductive surgery with R0 resection.      Procedure(s):  ROBOTIC HYSTERECTOMY, BILATERAL SALPINGO OOPHORECTOMY, SURGICAL STAGING appendectomy - Wound Class: Clean Contaminated  URETEROLYSIS, ROBOTIC ASSISTED USING DV5 - Wound Class: Clean Contaminated  OMENTECTOMY, ROBOT-ASSISTED, USING DV5 - Wound Class: Clean Contaminated  MIKAL-AORTIC LYMPH NODE DISSECTION, ROBOT-ASSISTED, USING DV5 - Wound Class: Clean Contaminated    Surgeon(s):  Hoang Laws M.D.    Anesthesiologist/Type of Anesthesia:  Anesthesiologist: Josue Sims M.D./General    Surgical Staff:  Circulator: Vonnie Hahn R.N.; Fadi Herndon R.N.  Relief Circulator: Kaylah Tripp R.N.  Scrub Person: Rhoda Correia; Nuria SMITH [place]  First Assist: Karlee Brock P.A.-C.    Specimens removed if any:  ID Type Source Tests Collected by Time Destination   A : pelvic washings for cytology Other Other PATHOLOGY MEDICAL CYTOLOGY, PATHOLOGY SPECIMEN Hoang Laws M.D. 7/29/2025 12:14 PM    B : uterus, cervix, bilateral tubes and ovaries rule out cancer Tissue Uterus PATHOLOGY SPECIMEN Hoang Laws M.D. 7/29/2025 12:54 PM    C : Post pelvic mass resection pelvic washings Other Other PATHOLOGY MEDICAL CYTOLOGY, PATHOLOGY SPECIMEN Hoang Laws M.D. 7/29/2025  1:26 PM    D : hepatic gutter washings for cytology Other Other PATHOLOGY MEDICAL CYTOLOGY, PATHOLOGY SPECIMEN Hoang Laws M.D. 7/29/2025  1:32 PM    E : splenic gutter washings for cytology Other Other PATHOLOGY MEDICAL CYTOLOGY, PATHOLOGY SPECIMEN Hoang Laws M.D. 7/29/2025  1:36 PM    F : left diaphragm periotneal biopsy Other Other PATHOLOGY SPECIMEN Hoang Laws M.D. 7/29/2025  1:40 PM    G :  pelvic droplet Other Other PATHOLOGY SPECIMEN Hoang Laws M.D. 7/29/2025  1:41 PM    H : cristiano hepatis biopsy Other Other PATHOLOGY SPECIMEN Hoang Laws M.D. 7/29/2025  2:14 PM    I : right diaphragm biopsy Other Other PATHOLOGY SPECIMEN Hoang Laws M.D. 7/29/2025  2:15 PM    J : left Gerota's fascia biopsy Other Other PATHOLOGY SPECIMEN Hoang Laws M.D. 7/29/2025  2:16 PM    K : left pericolic gutter biopsy Other Other PATHOLOGY SPECIMEN Hoang Laws M.D. 7/29/2025  2:17 PM    L : right gerota's fascia biopsy Other Other PATHOLOGY SPECIMEN Hoang Laws M.D. 7/29/2025  2:18 PM    M : left box 4 infrarenal lymph node Tissue Lymph Node PATHOLOGY SPECIMEN Hoang Laws M.D. 7/29/2025  2:47 PM    N : right box 1 aortic lymph node Tissue Lymph Node PATHOLOGY SPECIMEN Hoang Laws M.D. 7/29/2025  2:48 PM    O : Box 3 left aortic nodes   OK in formalin Tissue Lymph Node PATHOLOGY SPECIMEN Hoang Laws M.D. 7/29/2025  3:05 PM    P : P )  apendix Tissue Appendix PATHOLOGY SPECIMEN Hoang Laws M.D. 7/29/2025  3:06 PM    Q : )   Small bowel mesenteric biopsy Tissue Colon PATHOLOGY SPECIMEN Hoang Laws M.D. 7/29/2025  3:11 PM    R : r) left pelvic node Other Other PATHOLOGY SPECIMEN Hoang Laws M.D. 7/29/2025  3:21 PM    S : Left pelvic side wall Other Other PATHOLOGY SPECIMEN Hoang Laws M.D. 7/29/2025  3:37 PM    T : t) right pelvis side wall Other Other PATHOLOGY SPECIMEN Hoang Laws M.D. 7/29/2025  3:37 PM    U : u) Bladder peritoneum Other Other PATHOLOGY SPECIMEN oHang Laws M.D. 7/29/2025  3:39 PM    V : v) [place] Other Other PATHOLOGY SPECIMEN Hoang Laws M.D. 7/29/2025  3:40 PM    W : W )Omentum Other Other PATHOLOGY SPECIMEN Hoang Laws M.D. 7/29/2025  3:42 PM    X : X Left pelvic node Other Other PATHOLOGY SPECIMEN Hoang Laws M.D. 7/29/2025  3:17 PM        Estimated Blood Loss: 50 cc    Findings: No evidence of ascites.  Normal right left diaphragm.  Liver capsule smooth.  Stomach appear grossly  normal.  Posterior lobe of the liver was unremarkable central tendon was examined there was no seeding along here the left central tendon was also as examined.  The splenic gutter was explore no evidence of seeding the spleen was grossly normal pancreas was normal.  The quadrate lobe was observed there was no disease along here.  The lesser omentum was free of disease.  Retrogastric region free of disease.  The cristiano hepatis was free of disease.  Abdominal peritoneal surfaces were unremarkable.  The paracolic gutter peritoneum was all completely unremarkable.  The small bowel was inspected from ligament of Treitz to the ileocecal valve there was no evidence of disease along the mesentery of the serosa of the small bowel.  The appendix appeared grossly normal.    In the pelvis there was an enlarged cystic mass emanating from the left ovary.  The mass was densely attached to the sigmoid colon along with the left pelvic sidewall peritoneum.  The mass was compressing on the left ureter resulting in the left ureter hydroureter that was noted well above the iliac crest.  There was no evidence of excrescences of this and large cystic mass.  The mass had completely displaced the sigmoid colon that shifted the sigmoid colon all the way to the right pelvic sidewall the entire mass was filling the entire cul-de-sac sidewall to sidewall.  The right ovary was normal but did have evidence of endometriosis.  It appears that patient may have had a large endometriotic cyst.  The uterus is of normal size.  The bladder pelvic cul-de-sac peritoneum there is no evidence of excrescences.  It should be noted that the enlarged cystic mass was densely adherent to the anterior wall of the sigmoid colon did not fill the cul-de-sac.  I had to peel the anterior wall of the sigmoid colon rectal the way down to the cul-de-sac reflection.    Retroperitoneally both pelvic and aortic lymph nodes appear grossly normal.  There was paucity of right  supra CARMELO lymph nodes as there was minimal lymph nodes that was noted along here.  We did explore the entire lymphoid tissues lateral to the cava anterior to the cava interaortocaval nodes anterior to the aorta and lateral to the aorta on all 4 boxes.    The surgical dissection of this case qualifies as a modifier 22 from the standpoint of the pelvic disease the entire mass was filling the entire pelvic cavity with minimal exposure this mass extended to the left pelvic sidewall and densely attached to the external iliac artery and vein which we had to dissect off meticulously.  The mass extended out medially densely attached to the sigmoid colon which I had to peel off and distally it dorsally the inferior pole of the mass was attached to the sacral hollow dissecting it all the way down to the sacral follow-up.    Surgical assist was required for this complex dissection.  Ms. Brock provided traction and countertraction to optimize my surgical exposure to allow for me to perform these complex dissections of the ureterolysis excision of the pelvic mass and also a bilateral periaortic renal lymphadenectomy and omentectomy and multiple biopsies.    Complications: None        7/29/2025 4:43 PM Hoang Laws M.D.

## 2025-07-29 NOTE — ANESTHESIA PREPROCEDURE EVALUATION
Case: [id number] Date/Time: 07/29/25 0940    Procedures:       ROBOTIC HYSTERECTOMY, RIGHT AND OR LEFT SALPINGO OOPHORECTOMY, POSSIBLE SURGICAL STAGING, EXPLORATORY LAPAROTOMY      OMENTECTOMY, ROBOT-ASSISTED, USING DA MORALES XI      LAPAROTOMY, EXPLORATORY    Pre-op diagnosis: PELVIC MASS    Location: [facility] OR 17 / SURGERY [facility]    Surgeons: Hoang Laws M.D.            Relevant Problems   CARDIAC   (positive) Aneurysm of ascending aorta without rupture (HCC)       Physical Exam    Airway   Mallampati: II  TM distance: >3 FB  Neck ROM: full       Cardiovascular - normal exam  Rhythm: regular  Rate: normal    (-) murmur     Dental - normal exam           Pulmonary - normal examBreath sounds clear to auscultation     Abdominal    Neurological - normal exam                   Anesthesia Plan    ASA 2       Plan - general               Induction: intravenous    Postoperative Plan: Postoperative administration of opioids is intended.    Pertinent diagnostic labs and testing reviewed    Informed Consent:    Anesthetic plan and risks discussed with patient.    Use of blood products discussed with: patient whom consented to blood products.

## 2025-07-29 NOTE — ANESTHESIA PROCEDURE NOTES
Airway    Date/Time: 7/29/2025 10:55 AM    Performed by: Josue Sims M.D.  Authorized by: Josue Sims M.D.    Location:  OR  Urgency:  Elective  Indications for Airway Management:  Anesthesia      Spontaneous Ventilation: absent    Sedation Level:  Deep  Preoxygenated: Yes    Patient Position:  Sniffing  Mask Difficulty Assessment:  1 - vent by mask  Final Airway Type:  Endotracheal airway  Final Endotracheal Airway:  ETT  Cuffed: Yes    Technique Used for Successful ETT Placement:  Direct laryngoscopy    Insertion Site:  Oral  Blade Type:  Ivan  Laryngoscope Blade/Videolaryngoscope Blade Size:  3  ETT Size (mm):  7.0  Measured from:  Teeth  ETT to Teeth (cm):  22  Placement Verified by: auscultation and capnometry    Cormack-Lehane Classification:  Grade I - full view of glottis  Number of Attempts at Approach:  1  Number of Other Approaches Attempted:  0

## 2025-07-29 NOTE — ANESTHESIA TIME REPORT
Anesthesia Start and Stop Event Times       Date Time Event    7/29/2025 1040 Ready for Procedure     1046 Anesthesia Start     1636 Anesthesia Stop          Responsible Staff  07/29/25      Name Role Begin End    Josue Sims M.D. Anesth 1046 1636          Overtime Reason:  overtime    Comments:

## 2025-07-29 NOTE — DISCHARGE INSTRUCTIONS
HOME CARE INSTRUCTIONS    ACTIVITY: Rest and take it easy for the first 24 hours.  A responsible adult is recommended to remain with you during that time.  It is normal to feel sleepy.  We encourage you to not do anything that requires balance, judgment or coordination.    FOR 24 HOURS DO NOT:  Drive, operate machinery or run household appliances.  Drink beer or alcoholic beverages.  Make important decisions or sign legal documents.    SPECIAL INSTRUCTIONS:     No heavy lifting greater than 10 pounds until cleared by Dr. Laws.  Nothing in the vagina (I.e no tampons, douching, intercourse) until cleared by Dr. Laws's office  No driving while taking narcotics.  Call the office at [phone] if you develop any fevers, chills, nausea/vomiting, heavy vaginal bleeding, or redness, tenderness and/or drainage from your wound, if you have persistent watery discharge while ambulating or stool draining from the vagina.  Showering with warm water is ok, no bathing, swimming or hot tubing for at least 6 weeks to avoid infection.  You may remove wound dressing on postoperative day 1, and place loose bandages for two weeks.  You may have vaginal spotting or light bleeding which is normal.   If you have not had a bowel movement for 2 days, please take over the counter Milk of Magnesium, 1 tablespoon every 4 hours. After 4 doses and if you still have not had a bowel movement, please call your doctor.   You may eat a soft diet, such as soup, liquid, for day #1 and if tolerating you may resume your regular diet.  If you have had urinary stent(s) placed, please make arrangements to have removed 6 weeks after surgery.   If you have had a bowel resection, do no use suppositories.   If you have been informed you will have ureteral stents placed during your surgery, please make sure you receive an appointment six weeks from surgery for stent removal. If you are uncertain if you're having ureteral stents placed, call our office at  [phone].     DIET: To avoid nausea, slowly advance diet as tolerated, avoiding spicy or greasy foods for the first day.  Add more substantial food to your diet according to your physician's instructions.  Babies can be fed formula or breast milk as soon as they are hungry.  INCREASE FLUIDS AND FIBER TO AVOID CONSTIPATION.    SURGICAL DRESSING/BATHING: Keep dressing clean, dry, intact. Do not submerge dressings in water (hot tub, bath tub, swimming pool) until cleared by Dr. Laws    MEDICATIONS: Resume taking daily medication.  Take prescribed pain medication with food.  If no medication is prescribed, you may take non-aspirin pain medication if needed.  PAIN MEDICATION CAN BE VERY CONSTIPATING.  Take a stool softener or laxative such as senokot, pericolace, or milk of magnesia if needed.      Last pain medication given at 4:56 pm. Oxycodone    A follow-up appointment should be arranged with your doctor in 1-2 weeks; call to schedule.    You should CALL YOUR PHYSICIAN if you develop:  Fever greater than 101 degrees F.  Pain not relieved by medication, or persistent nausea or vomiting.  Excessive bleeding (blood soaking through dressing) or unexpected drainage from the wound.  Extreme redness or swelling around the incision site, drainage of pus or foul smelling drainage.  Inability to urinate or empty your bladder within 8 hours.  Problems with breathing or chest pain.    You should call 911 if you develop problems with breathing or chest pain.  If you are unable to contact your doctor or surgical center, you should go to the nearest emergency room or urgent care center.  Physician's telephone #: Dr. Laws [phone]    MILD FLU-LIKE SYMPTOMS ARE NORMAL.  YOU MAY EXPERIENCE GENERALIZED MUSCLE ACHES, THROAT IRRITATION, HEADACHE AND/OR SOME NAUSEA.    If any questions arise, call your doctor.  If your doctor is not available, please feel free to call the Surgical Center at [phone]. The Center is open Monday  through Friday from 7AM to 7PM.      A registered nurse may call you a few days after your surgery to see how you are doing after your procedure.    You may also receive a survey in the mail within the next two weeks and we ask that you take a few moments to complete the survey and return it to us.  Our goal is to provide you with very good care and we value your comments.     Depression / Suicide Risk    As you are discharged from this [facility] facility, it is important to learn how to keep safe from harming yourself.    Recognize the warning signs:  Abrupt changes in personality, positive or negative- including increase in energy   Giving away possessions  Change in eating patterns- significant weight changes-  positive or negative  Change in sleeping patterns- unable to sleep or sleeping all the time   Unwillingness or inability to communicate  Depression  Unusual sadness, discouragement and loneliness  Talk of wanting to die  Neglect of personal appearance   Rebelliousness- reckless behavior  Withdrawal from people/activities they love  Confusion- inability to concentrate     If you or a loved one observes any of these behaviors or has concerns about self-harm, here's what you can do:  Talk about it- your feelings and reasons for harming yourself  Remove any means that you might use to hurt yourself (examples: pills, rope, extension cords, firearm)  Get professional help from the community (Mental Health, Substance Abuse, psychological counseling)  Do not be alone:Call your Safe Contact- someone whom you trust who will be there for you.  Call your local CRISIS HOTLINE 598-9072 or 425-970-6960  Call your local Children's Mobile Crisis Response Team [place] (371) 451-0431 or www.[employer]  Call the toll free National Suicide Prevention Hotlines   National Suicide Prevention Lifeline 424-699-JGZI (9398)  National Hope Line Network 800-SUICIDE (032-9832)    I acknowledge receipt and understanding  of these Home Care instructions.

## 2025-07-30 NOTE — ANESTHESIA POSTPROCEDURE EVALUATION
Patient: Jerad Rodriguez    Procedure Summary       Date: 07/29/25 Room / Location: [place] [address] / SURGERY [facility]    Anesthesia Start: 1046 Anesthesia Stop: 1636    Procedures:       ROBOTIC HYSTERECTOMY, BILATERAL SALPINGO OOPHORECTOMY, SURGICAL STAGING appendectomy (Bilateral: Abdomen)      URETEROLYSIS, ROBOTIC ASSISTED USING DV5 (Bilateral: Ureter)      OMENTECTOMY, ROBOT-ASSISTED, USING DV5 (Abdomen)      MIKAL-AORTIC LYMPH NODE DISSECTION, ROBOT-ASSISTED, USING DV5 (Bilateral: Abdomen) Diagnosis: (PELVIC MASS)    Surgeons: Hoang Laws M.D. Responsible Provider: Josue Sims M.D.    Anesthesia Type: general ASA Status: 2            Final Anesthesia Type: general  Last vitals  BP   Blood Pressure : (!) 144/81    Temp   36 °C (96.8 °F)    Pulse   89   Resp   15    SpO2   98 %      Anesthesia Post Evaluation    Patient location during evaluation: PACU  Patient participation: complete - patient participated  Level of consciousness: awake and alert    Airway patency: patent  Anesthetic complications: no  Cardiovascular status: hemodynamically stable  Respiratory status: acceptable  Hydration status: euvolemic    PONV: none          No notable events documented.     Nurse Pain Score: 0 (NPRS)

## 2025-07-30 NOTE — OR NURSING
1900 Pt arrived from PACU  Pt is A&O x4, pt's VSS, denied any pain, nausea and vomiting. Surgical dressing to abdomen CDI with old bloody drainage to umbilical site.    Pt ambulated to the bathroom to void. Unable to void. Bladder scan showed 983cc PVR.     @1947 Dr. Laws and Karlee HAYNES updated through voalte. Orders to insert johnson cath received from Karlee HAYNES and she also stated that pt can go home with johnson cath and that she will schedule pt for her follow-up appointment on Thursday or Friday for bladder challenge. Pt updated with the plan and that [facility] will call her.     2028 Patient states ready to d/c home. Discharge instructions reviewed with pt and pt's spouse including catheter care. Copy provided to pt. Pt verbalizes understanding. Pt states all questions have been answered. Signed copy in chart. Pt old drainage to umbilical site noted; dressing replaced with gauze and tegaderm.     Pt states that all personal belongings are in possession.     2035 Pt d/c in stable condition via wheelchair with spouse, escorted by RN.

## 2025-07-30 NOTE — OP REPORT
PreOp Diagnosis: 15 cm complex ovarian cyst rule out ovarian cancer   40.6  Bilateral hydronephrosis secondary to compression of the pelvic mass        PostOp Diagnosis: Same as above  Stage Ia grade 3 endometrioid adenocarcinoma versus stage III  Status post optimal cytoreductive surgery with R0 resection.        Procedure(s):  ROBOTIC HYSTERECTOMY, BILATERAL SALPINGO OOPHORECTOMY, SURGICAL STAGING appendectomy - Wound Class: Clean Contaminated  URETEROLYSIS, ROBOTIC ASSISTED USING DV5 - Wound Class: Clean Contaminated  OMENTECTOMY, ROBOT-ASSISTED, USING DV5 - Wound Class: Clean Contaminated  MIKAL-AORTIC LYMPH NODE DISSECTION, ROBOT-ASSISTED, USING DV5 - Wound Class: Clean Contaminated     Surgeon(s):  Hoang Laws M.D.     Anesthesiologist/Type of Anesthesia:  Anesthesiologist: Josue Sims M.D./General     Surgical Staff:  Circulator: Vonnie Hahn RSHANTI; Fadi Herndon R.N.  Relief Circulator: Kaylah Tripp R.N.  Scrub Person: Rhoda Correia; Nuria Valencia  First Assist: Karlee Brock P.A.-C.     Specimens removed if any:  ID Type Source Tests Collected by Time Destination   A : pelvic washings for cytology Other Other PATHOLOGY MEDICAL CYTOLOGY, PATHOLOGY SPECIMEN Hoang Laws M.D. 7/29/2025 12:14 PM     B : uterus, cervix, bilateral tubes and ovaries rule out cancer Tissue Uterus PATHOLOGY SPECIMEN Hoang Laws M.D. 7/29/2025 12:54 PM     C : Post pelvic mass resection pelvic washings Other Other PATHOLOGY MEDICAL CYTOLOGY, PATHOLOGY SPECIMEN Hoang Laws M.D. 7/29/2025  1:26 PM     D : hepatic gutter washings for cytology Other Other PATHOLOGY MEDICAL CYTOLOGY, PATHOLOGY SPECIMEN Hoang Laws M.D. 7/29/2025  1:32 PM     E : splenic gutter washings for cytology Other Other PATHOLOGY MEDICAL CYTOLOGY, PATHOLOGY SPECIMEN Hoang Laws M.D. 7/29/2025  1:36 PM     F : left diaphragm periotneal biopsy Other Other PATHOLOGY SPECIMEN Hoang Laws M.D. 7/29/2025  1:40 PM     G : pelvic droplet  Other Other PATHOLOGY SPECIMEN Hoang Laws M.D. 7/29/2025  1:41 PM     H : cristiano hepatis biopsy Other Other PATHOLOGY SPECIMEN Hoang Laws M.D. 7/29/2025  2:14 PM     I : right diaphragm biopsy Other Other PATHOLOGY SPECIMEN Hoang Laws M.D. 7/29/2025  2:15 PM     J : left Gerota's fascia biopsy Other Other PATHOLOGY SPECIMEN Hoang Laws M.D. 7/29/2025  2:16 PM     K : left pericolic gutter biopsy Other Other PATHOLOGY SPECIMEN Hoang Laws M.D. 7/29/2025  2:17 PM     L : right gerota's fascia biopsy Other Other PATHOLOGY SPECIMEN Hoang Laws M.D. 7/29/2025  2:18 PM     M : left box 4 infrarenal lymph node Tissue Lymph Node PATHOLOGY SPECIMEN Hoang Laws M.D. 7/29/2025  2:47 PM     N : right box 1 aortic lymph node Tissue Lymph Node PATHOLOGY SPECIMEN Hoang Laws M.D. 7/29/2025  2:48 PM     O : Box 3 left aortic nodes   OK in formalin Tissue Lymph Node PATHOLOGY SPECIMEN Hoang Laws M.D. 7/29/2025  3:05 PM     P : P )  apendix Tissue Appendix PATHOLOGY SPECIMEN Hoang Laws M.D. 7/29/2025  3:06 PM     Q : )   Small bowel mesenteric biopsy Tissue Colon PATHOLOGY SPECIMEN Hoang Laws M.D. 7/29/2025  3:11 PM     R : r) left pelvic node Other Other PATHOLOGY SPECIMEN Hoang Laws M.D. 7/29/2025  3:21 PM     S : Left pelvic side wall Other Other PATHOLOGY SPECIMEN Hoang Laws M.D. 7/29/2025  3:37 PM     T : t) right pelvis side wall Other Other PATHOLOGY SPECIMEN Hoang Laws M.D. 7/29/2025  3:37 PM     U : u) Bladder peritoneum Other Other PATHOLOGY SPECIMEN Hoang Laws M.D. 7/29/2025  3:39 PM     V : v) [place] Other Other PATHOLOGY SPECIMEN Hoang Laws M.D. 7/29/2025  3:40 PM     W : W )Omentum Other Other PATHOLOGY SPECIMEN Hoang Laws M.D. 7/29/2025  3:42 PM     X : X Left pelvic node Other Other PATHOLOGY SPECIMEN Hoang Laws M.D. 7/29/2025  3:17 PM           Estimated Blood Loss: 50 cc     Findings: No evidence of ascites.  Normal right left diaphragm.  Liver capsule smooth.  Stomach appear grossly  normal.  Posterior lobe of the liver was unremarkable central tendon was examined there was no seeding along here the left central tendon was also as examined.  The splenic gutter was explore no evidence of seeding the spleen was grossly normal pancreas was normal.  The quadrate lobe was observed there was no disease along here.  The lesser omentum was free of disease.  Retrogastric region free of disease.  The cristiano hepatis was free of disease.  Abdominal peritoneal surfaces were unremarkable.  The paracolic gutter peritoneum was all completely unremarkable.  The small bowel was inspected from ligament of Treitz to the ileocecal valve there was no evidence of disease along the mesentery of the serosa of the small bowel.  The appendix appeared grossly normal.     In the pelvis there was an enlarged cystic mass emanating from the left ovary.  The mass was densely attached to the sigmoid colon along with the left pelvic sidewall peritoneum.  The mass was compressing on the left ureter resulting in the left ureter hydroureter that was noted well above the iliac crest.  There was no evidence of excrescences of this and large cystic mass.  The mass had completely displaced the sigmoid colon that shifted the sigmoid colon all the way to the right pelvic sidewall the entire mass was filling the entire cul-de-sac sidewall to sidewall.  The right ovary was normal but did have evidence of endometriosis.  It appears that patient may have had a large endometriotic cyst.  The uterus is of normal size.  The bladder pelvic cul-de-sac peritoneum there is no evidence of excrescences.  It should be noted that the enlarged cystic mass was densely adherent to the anterior wall of the sigmoid colon did not fill the cul-de-sac.  I had to peel the anterior wall of the sigmoid colon rectal the way down to the cul-de-sac reflection.     Retroperitoneally both pelvic and aortic lymph nodes appear grossly normal.  There was paucity of right  supra CARMELO lymph nodes as there was minimal lymph nodes that was noted along here.  We did explore the entire lymphoid tissues lateral to the cava anterior to the cava interaortocaval nodes anterior to the aorta and lateral to the aorta on all 4 boxes.     The surgical dissection of this case qualifies as a modifier 22 from the standpoint of the pelvic disease the entire mass was filling the entire pelvic cavity with minimal exposure this mass extended to the left pelvic sidewall and densely attached to the external iliac artery and vein which we had to dissect off meticulously.  The mass extended out medially densely attached to the sigmoid colon which I had to peel off and distally it dorsally the inferior pole of the mass was attached to the sacral hollow dissecting it all the way down to the sacral follow-up.     Surgical assist was required for this complex dissection.  Ms. Brock provided traction and countertraction to optimize my surgical exposure to allow for me to perform these complex dissections of the ureterolysis excision of the pelvic mass and also a bilateral periaortic renal lymphadenectomy and omentectomy and multiple biopsies.     Complications: None    Indication: Mrs. Rodriguez is a pleasant 69-year-old female who was referred to me because of a 15 cm complex cystic pelvic mass.  She has a  of 40.6. which is concerning for possible malignancy for this reason patient was advised to undergo surgical pathological evaluation.  Frozen section performed and depending upon frozen section she has been counseled about proceeding with possible surgical staging as clinically indicated.    Risk benefits of rational procedures were reviewed with the patient detail patient's understanding of these risks wished to proceed with surgery as planned.    Procedure: After achieving adequate anesthesia patient was prepped and draped in place modified dorsolithotomy position.  Surgical timeout was called and the  surgical team agreed with procedure.    I elected to place my robotic ports in the lower pelvic port placement configuration with the anticipation of possibility of having to require perform a multi quadrant surgery due to the unclear nature of this pelvic mass whether this is malignant does not.    Lower pelvic ports were placed to allow for multi-quadrant surgery to be performed.  Initially, an 8mm  umbilical incision was made.  A Veress needle was introduced.  Pneumoperitoneum was achieved to abdominal pressure of 15 mmHg and then 8 mm Trocar was placed.  This served as my camera port for the pelvic node dissection.  I then april a line between the anterior superior iliac spine to serve as my landmark to place my lower pelvic ports. After this line was drawn, I then placed my 8mm robotic ports 3 cm below this line offsetting it to midline. Four 8 mm robotic ports were placed 8 cm apart under direct laparoscopic visualization. A 12 mm  assistant step trocar port was placed in the right lower quadrant at the level of the anterior superior iliac spine.    After these ports were appropriately placed under direct laparoscopic visualization, patient was then placed in deep Trendelenburg position. The robot was then docked to target the abdominal dissection.      I went ahead and obtain pelvic washings before we proceeded with our dissection.  Initial focus was turned towards the pelvic disease.  The enlarged cystic mass was noted to.  Initially use my third arm and my assistant to provide traction and countertraction.  I then proceeded on to identify the surgical plane using chromone differentiation and chromone dissection I was able to retract the sigmoid colon to the right and the cystic mass to the left and essentially dissected the sigmoid colon as there was a plane along here to dissect the sigmoid colon and the mesentery off the medial aspect of the large cystic mass.  After completion of this I then started my  dissection on the left side.  The left posterior broad ligament was incised the left paracolic gutter peritoneum was incised so that I can get access to the left pararectal space.  I went ahead and identified the left ureter as across the left common iliac vessels the left ovarian vessels was then subsequent skeletonized and isolated and cauterized above the pelvic brim and subsequently divided.  The lateral aspect of this MR cystic mass was densely attached to the left external iliac artery and vein.  I gingerly developed that left pararectal space and mobilizing the mass and dissected the mass off the external iliac artery and vein.  I then turned my focus towards the dorsum of this mass as this was compressing on the ureter resulting in the hydroureter I went ahead and identified the ureter and staying right on the ureter the cystic mass was elevated and ureterolysis was performed from the level of the pelvic brim into the ureteric tunnel.  As I continued my dissection because of the enlarged cystic mass I was not able to really see a good exposure at this point in time I elected to decompress the cyst.  Thus using a 2-0 Vicryl suture a pursestring suture was placed around the upper pole of the cystic mass.  It should be noted that the cystic mass the wall was exceedingly thin soon as I put a suture into the cystic wall that was leaking however in order to control the leak, Ms. Brock my bedside assistant constantly evacuated the cystic wall.  In addition I placed a Ray-Frieda where I had put the suture around so that the fluid will get absorbed.  There was a little bit of fluid that was leaking and we tried to suck this out was much as we can.  After the pursestring suture was placed I was able to decompress the cyst.  There was some fluid that did escape again all this fluid that escaped was absorbed into the Ray-Frieda.  After that cyst was decompressed we closed the cystotomy site with 2-0 Vicryl suture to  prevent any further leakage.  We were able to remove the fluid which was serous and somewhat old blood clinically consistent with the endometriotic cyst.  After decompressing the cyst it gave us a working operating space to allow to complete the remainder of the hysterectomy.  I then proceeded with the hysterectomy with bilateral salpingo-oophorectomy with ureterolysis the usual standard fashion performed.      The left posterior leaf of the broad ligament was incised.  The left pararectal spaces were developed.  The left ureter was identified in the medial leaf of the peritoneum. The avascular space of Graves was then created.  The left ovarian vessels were then subsequently isolated and bipolar cautery was used to cauterize the  left IP and subsequently divided.  The medial leaf of the peritoneum was then incised down to the level of the uterosacral ligament.  The left ureter was identified and was dissected laterally while developing the Okabayashi space, following completion of this the left round ligament was then divided followed by the anterior broad leaf ligament.  The deep uterine vein was skeletonized isolated cauterized well away from the ureter.    Left ureterolysis was undertaken the course of the ureter was followed into the ureteric tunnel.  The anterior division of the left anterior hypogastric artery was then skeletonized.  The left uterine artery was identified.  The left ureter was then followed into the l left ureteric tunnel isolating the left uterine artery.  The left uterine artery was then skeletonized and isolated hemolock clip was applied clipped and subsequently divided.  The bladder peritoneum was taken down.  The large anterior broad ligament leiomyoma was retracted ventrally to provide maximal countertraction.  The course of the left ureter was followed into the ureterovesical junction.  The left vesicle cervical artery came close proximity with a broad ligament leiomyoma the broad  ligament leiomyoma at the base of it and laterally was in close proximity to the ureter at the level of the ureterovesical junction.  Meticulous careful dissection was undertaken to dissect the ureter out to ensure that we did not compromise the ureter.      After completion of this the focus was then turned to the left side.  The left posterior broad ligament was incised.  The left ureter was identified.  The left ovarian vessels were skeletonized and isolated bipolar cautery was used to cauterize the left ovarian vessels and subsequently divided.  The medial leaf of the peritoneum was incised down to the level of the uterosacral ligament.  The ureter was then dissected laterally developing the old Enrrique space on the right side.  Upon completion of this the Prograsp from the  robotic “3rd arm” was then used to grasp the triple pedicle and counter traction was provided while the cardinal ligament was exposed. Uterine artery and vein were then subsequently skeletonized.  Using the bipolar cautery, the uterine artery and vein were then cauterized juxtaposition to the fundus of the uterus.  The remainder of the lower uterine segment was likewise divided. After completion of this, the uterus was then retracted ventrally and the uterosacral ligaments were then independently divided.  Great care was then taken to clearly not injure the ureter.  After the uterosacral ligaments were then divided, the anterior branches of the uterine vessels were then subsequently skeletonized and cauterized with bipolar cautery and divided.  The bladder peritoneum was then subsequently taken down.  Once we were assured that the bladder was dissected off the paracervical fascia, the posterior colpotomy posterior colpotomy was made.  The vagina was circumferentially incised to complete the hysterectomy and BSO.  A 15 cm endobag was then delivered through the vagina. The specimen was delivered through the vagina. The specimen was removed  through the vaginal vault without difficulty. The vaginal cuff was then closed with O Quill PDS suture in 2 layer running fashion.  The enlarged left ovary was sent for frozen section which returned as benign.  Thus we  proceeded with surgical staging.      After completion of this I then removed the robotic instrumentation.  The robot was then undocked and the robotic boom was then rotated back to the abdominal dissection to complete the dissection.  Robotic instrumentation was removed robotic system was then docked the robotic boom was then rotated back to the pelvis.  Patient was placed in 28 degree Trendelenburg.  Robot system was then redocked instrumentation was placed under direct endoscopic visualization.  After completion of this I then proceeded with abdominal dissection.  At this point in time the only dissection that needed to be performed was the abdominal  dissection.     My initial focus was then turned towards omentectomy. I have initially turned my focus towards the omentectomy.  Complete omentectomy was performed.  We initially started by dissection on the left side.  Phrenicocolic ligament was taken down.  The splenic omentum attached to the cardia portion of the greater curvature of the stomach was divided preserving the gastro omental vessels.  As the dissection continued along great care was undertaken to ensure that the splenic hilum was not compromised laterally and the pancreas was not compromised posteriorly and inferiorly.  I then came across the fundal portion along the greater curvature and lesser sac was entered. The gastro colic omentum was  from the mesocolon. The remainder of the gastrocolic omentum was then divided.  After completion of this I then turned my focus towards the infracolic omentum.  The infracolic omentum was dissected off the transverse colon great care was undertaken to ensure that  the energy did not come close to the serosa of the transverse colon.   Great  care was undertaken to not compromise the colon. Complete omentectomy was undertaken without compromising the colon, stomach, spleen or pancrease.     I then proceeded along with multiple biopsies.  We performed random biopsy on the right and left diaphragm followed by right and left Gerota's fascia followed by right and left paracolic gutter peritoneum.      I then incised the peritoneum overlying the sacral promontory and this incision was then incised midline parallel along the aorta and vena cava up to the level of the duodenal recess.  Using the third arm, the lateral edge of the incised peritoneum was then retracted laterally.  The right and left abdominal ureters were identified.  The lymph node bearing tissues anterior and lateral to the vena cava were resected from the level of bifurcation cephalad to where the right ovarian vein drains into the vena cava.  Similarly, the left aortic node dissection was carried out.  The lymph node bearing tissues anterior and lateral to the aorta were resected from the level of the bifurcation to the level where the CARMELO inserts.  The CARMELO was not compromised.  Hemoclips were used where appropriate for lymphostasis and hemostasis.    The  left supra CARMELO lymph node bearing tissue lateral and anterior to aorta, superior to the CARMELO was resected . The left ureter was identified and dissected laterally to keep it out of harms way and the left ovarian vein was indentified and the course of the left ovarian vein was followed into the the left renal vein.    After completion of this I went ahead and performed an appendectomy.  The meso appendix was taken down with the Synchro seal.  After the appendix was skeletonized a robotic 8 mm NAN stapler was delivered through arm #3 and appendectomy was performed without difficulty.    After completion of this I then removed the robotic instrumentation.  The robot was then undocked and the robotic boom was then rotated back to the pelvic  dissection to complete my pelvic dissection.  Robotic instrumentation was removed robotic system was then docked the robotic boom was then rotated back to the pelvis.  Patient was placed in 28 degree Trendelenburg.  Robot system was then redocked instrumentation was placed under direct endoscopic visualization.  After completion of this I then proceeded with my pelvic dissection.  At this point in time the only dissection that needed to be performed was the left pelvic lymph node dissection.  We did not proceed with the right as the mass was on the left side.  I then also performed biopsies on the right and left pelvic sidewall, followed by bladder, followed by cul-de-sac peritoneal biopsies.    An incision was made parallel along the peritoneum overlying the psoas muscle.  All the lymph node bearing tissues along the external iliac artery and vein were subsequently skeletonized off the vessels and resected.  The lymph node bearing tissues interposed between the external iliac vein and psoas muscle were mobilized into the obturator fossa and subsequently removed off the accessory obturator vein, artery and nerve.  In the process of removing these lymphoid tissues, the genitofemoral nerve along with the accessory obturator vein, obturator artery and nerve were all preserved.  The lymphoid tissues interposed between the external iliac vein and psoas muscle along with the common iliac vessels were also subsequently removed.  The lymph node bearing tissues bifurcating at the hypogastric and the external iliac vein were likewise removed in addition to the hypogastric lymph nodes.  All the lymph node tissues were placed in an Endobag and removed and submitted as pelvic nodes on the right side and subsequently the left side.  Boundaries of the pelvic node dissection distally were the external circumflex iliac vein, laterally the psoas muscle along with the obturator internus fascia, medially the superior vesical artery  "along with the ureter and inferiorly below the obturator nerve.      At the completion of this I then proceeded on with the right and left pelvic sidewall peritoneal biopsy followed by bladder and cul-de-sac peritoneal biopsies.  After completion of this I then closed the vaginal cuff with \"PDS suture in 2 layer standard closure fashion.  Because of the earlier leak fluid we took the second pelvic washings which I called postresection pelvic washings to determine if there is any cancer cells that we noted.  At the completion of this we also took washings along the hepatic gutter and splenic gutter.  I then irrigated the pelvis with 2 L of fluid and the right and left hepatic and splenic gutter respectively 1 L.    At the conclusion of the procedure there was no gross residual tumor remaining we were able to achieve R0 resection.    Patient tolerated procedure well without any difficulties was extubated and transferred to the PACU in stable condition.                           "

## 2025-07-30 NOTE — OR NURSING
Patient arrived in PACU, VSS. Abdominal incisions x5 covered with guaze and tegaderm, CDI. She is alert and oriented x4. Medicated for pain and nausea per MAR.   Post op X-ray complete. Retrieved all belongings from locker and place on bedside.   Updated , Marcos, of POC. Patient up to bathroom, unsuccessful attempt to urinate. Report called to phase two, ZAC Mars. Transported to phase 2.

## 2025-08-20 DIAGNOSIS — Z51.11 ENCOUNTER FOR ANTINEOPLASTIC CHEMOTHERAPY: ICD-10-CM

## 2025-08-20 DIAGNOSIS — C56.9 MALIGNANT NEOPLASM OF OVARY, UNSPECIFIED LATERALITY (HCC): Primary | ICD-10-CM

## 2025-08-22 ENCOUNTER — HOSPITAL ENCOUNTER (OUTPATIENT)
Facility: MEDICAL CENTER | Age: 70
End: 2025-08-22
Payer: MEDICARE

## 2025-08-22 DIAGNOSIS — Z51.11 ENCOUNTER FOR ANTINEOPLASTIC CHEMOTHERAPY: ICD-10-CM

## 2025-08-22 DIAGNOSIS — C56.9 MALIGNANT NEOPLASM OF OVARY, UNSPECIFIED LATERALITY (HCC): ICD-10-CM

## 2025-08-22 LAB
ALBUMIN SERPL BCP-MCNC: 4.1 G/DL (ref 3.2–4.9)
ALBUMIN/GLOB SERPL: 1.6 G/DL
ALP SERPL-CCNC: 67 U/L (ref 30–99)
ALT SERPL-CCNC: 24 U/L (ref 2–50)
ANION GAP SERPL CALC-SCNC: 11 MMOL/L (ref 7–16)
AST SERPL-CCNC: 22 U/L (ref 12–45)
BASOPHILS # BLD AUTO: 1.6 % (ref 0–1.8)
BASOPHILS # BLD: 0.1 K/UL (ref 0–0.12)
BILIRUB SERPL-MCNC: 0.3 MG/DL (ref 0.1–1.5)
BUN SERPL-MCNC: 15 MG/DL (ref 8–22)
CALCIUM ALBUM COR SERPL-MCNC: 9.4 MG/DL (ref 8.5–10.5)
CALCIUM SERPL-MCNC: 9.5 MG/DL (ref 8.5–10.5)
CHLORIDE SERPL-SCNC: 104 MMOL/L (ref 96–112)
CO2 SERPL-SCNC: 22 MMOL/L (ref 20–33)
CREAT SERPL-MCNC: 0.65 MG/DL (ref 0.5–1.4)
EOSINOPHIL # BLD AUTO: 0.33 K/UL (ref 0–0.51)
EOSINOPHIL NFR BLD: 5.2 % (ref 0–6.9)
ERYTHROCYTE [DISTWIDTH] IN BLOOD BY AUTOMATED COUNT: 43.6 FL (ref 35.9–50)
GFR SERPLBLD CREATININE-BSD FMLA CKD-EPI: 94 ML/MIN/1.73 M 2
GLOBULIN SER CALC-MCNC: 2.5 G/DL (ref 1.9–3.5)
GLUCOSE SERPL-MCNC: 97 MG/DL (ref 65–99)
HCT VFR BLD AUTO: 39.1 % (ref 37–47)
HGB BLD-MCNC: 12.9 G/DL (ref 12–16)
IMM GRANULOCYTES # BLD AUTO: 0.01 K/UL (ref 0–0.11)
IMM GRANULOCYTES NFR BLD AUTO: 0.2 % (ref 0–0.9)
LYMPHOCYTES # BLD AUTO: 1.53 K/UL (ref 1–4.8)
LYMPHOCYTES NFR BLD: 24.1 % (ref 22–41)
MCH RBC QN AUTO: 30.5 PG (ref 27–33)
MCHC RBC AUTO-ENTMCNC: 33 G/DL (ref 32.2–35.5)
MCV RBC AUTO: 92.4 FL (ref 81.4–97.8)
MONOCYTES # BLD AUTO: 0.6 K/UL (ref 0–0.85)
MONOCYTES NFR BLD AUTO: 9.4 % (ref 0–13.4)
NEUTROPHILS # BLD AUTO: 3.79 K/UL (ref 1.82–7.42)
NEUTROPHILS NFR BLD: 59.5 % (ref 44–72)
NRBC # BLD AUTO: 0 K/UL
NRBC BLD-RTO: 0 /100 WBC (ref 0–0.2)
PLATELET # BLD AUTO: 324 K/UL (ref 164–446)
PMV BLD AUTO: 9.6 FL (ref 9–12.9)
POTASSIUM SERPL-SCNC: 4.3 MMOL/L (ref 3.6–5.5)
PROT SERPL-MCNC: 6.6 G/DL (ref 6–8.2)
RBC # BLD AUTO: 4.23 M/UL (ref 4.2–5.4)
SODIUM SERPL-SCNC: 137 MMOL/L (ref 135–145)
WBC # BLD AUTO: 6.4 K/UL (ref 4.8–10.8)

## 2025-08-22 PROCEDURE — 36415 COLL VENOUS BLD VENIPUNCTURE: CPT

## 2025-08-22 PROCEDURE — 80053 COMPREHEN METABOLIC PANEL: CPT

## 2025-08-22 PROCEDURE — 86304 IMMUNOASSAY TUMOR CA 125: CPT

## 2025-08-22 PROCEDURE — 85025 COMPLETE CBC W/AUTO DIFF WBC: CPT

## 2025-08-23 LAB — CANCER AG125 SERPL-ACNC: 96.1 U/ML (ref 0–35)

## 2025-08-29 ENCOUNTER — HOSPITAL ENCOUNTER (OUTPATIENT)
Facility: MEDICAL CENTER | Age: 70
End: 2025-08-29
Attending: SPECIALIST
Payer: MEDICARE

## 2025-08-29 LAB
APPEARANCE UR: CLEAR
BACTERIA #/AREA URNS HPF: ABNORMAL /HPF
BILIRUB UR QL STRIP.AUTO: NEGATIVE
CASTS URNS QL MICRO: ABNORMAL /LPF (ref 0–2)
COLOR UR: YELLOW
EPITHELIAL CELLS 1715: ABNORMAL /HPF (ref 0–5)
GLUCOSE UR STRIP.AUTO-MCNC: NEGATIVE MG/DL
KETONES UR STRIP.AUTO-MCNC: NEGATIVE MG/DL
LEUKOCYTE ESTERASE UR QL STRIP.AUTO: ABNORMAL
MICRO URNS: ABNORMAL
NITRITE UR QL STRIP.AUTO: NEGATIVE
PH UR STRIP.AUTO: 7.5 [PH] (ref 5–8)
PROT UR QL STRIP: 30 MG/DL
RBC # URNS HPF: ABNORMAL /HPF
RBC UR QL AUTO: ABNORMAL
SP GR UR STRIP.AUTO: 1.01
UROBILINOGEN UR STRIP.AUTO-MCNC: 0.2 EU/DL
WBC #/AREA URNS HPF: ABNORMAL /HPF

## 2025-08-29 PROCEDURE — 87086 URINE CULTURE/COLONY COUNT: CPT

## 2025-08-29 PROCEDURE — 81001 URINALYSIS AUTO W/SCOPE: CPT

## 2025-08-31 LAB
BACTERIA UR CULT: NORMAL
SIGNIFICANT IND 70042: NORMAL
SITE SITE: NORMAL
SOURCE SOURCE: NORMAL